# Patient Record
Sex: MALE | Race: WHITE | NOT HISPANIC OR LATINO | Employment: FULL TIME | URBAN - METROPOLITAN AREA
[De-identification: names, ages, dates, MRNs, and addresses within clinical notes are randomized per-mention and may not be internally consistent; named-entity substitution may affect disease eponyms.]

---

## 2019-01-17 ENCOUNTER — TELEPHONE (OUTPATIENT)
Dept: INTERNAL MEDICINE | Facility: CLINIC | Age: 59
End: 2019-01-17

## 2019-01-17 ENCOUNTER — APPOINTMENT (OUTPATIENT)
Dept: PREADMISSION TESTING | Facility: HOSPITAL | Age: 59
End: 2019-01-17

## 2019-01-17 DIAGNOSIS — R97.20 ABNORMAL PSA: Primary | ICD-10-CM

## 2019-01-17 LAB
ANION GAP SERPL CALCULATED.3IONS-SCNC: 3 MMOL/L (ref 3–11)
BUN BLD-MCNC: 13 MG/DL (ref 9–23)
BUN/CREAT SERPL: 13 (ref 7–25)
CALCIUM SPEC-SCNC: 9.2 MG/DL (ref 8.7–10.4)
CHLORIDE SERPL-SCNC: 106 MMOL/L (ref 99–109)
CO2 SERPL-SCNC: 30 MMOL/L (ref 20–31)
CREAT BLD-MCNC: 1 MG/DL (ref 0.6–1.3)
DEPRECATED RDW RBC AUTO: 41.4 FL (ref 37–54)
ERYTHROCYTE [DISTWIDTH] IN BLOOD BY AUTOMATED COUNT: 13 % (ref 11.3–14.5)
GFR SERPL CREATININE-BSD FRML MDRD: 77 ML/MIN/1.73
GLUCOSE BLD-MCNC: 106 MG/DL (ref 70–100)
HCT VFR BLD AUTO: 46.5 % (ref 38.9–50.9)
HGB BLD-MCNC: 15.7 G/DL (ref 13.1–17.5)
MCH RBC QN AUTO: 29.8 PG (ref 27–31)
MCHC RBC AUTO-ENTMCNC: 33.8 G/DL (ref 32–36)
MCV RBC AUTO: 88.2 FL (ref 80–99)
PLATELET # BLD AUTO: 199 10*3/MM3 (ref 150–450)
PMV BLD AUTO: 10.2 FL (ref 6–12)
POTASSIUM BLD-SCNC: 4.5 MMOL/L (ref 3.5–5.5)
RBC # BLD AUTO: 5.27 10*6/MM3 (ref 4.2–5.76)
SODIUM BLD-SCNC: 139 MMOL/L (ref 132–146)
WBC NRBC COR # BLD: 8.39 10*3/MM3 (ref 3.5–10.8)

## 2019-01-17 PROCEDURE — 80048 BASIC METABOLIC PNL TOTAL CA: CPT | Performed by: SURGERY

## 2019-01-17 PROCEDURE — 85027 COMPLETE CBC AUTOMATED: CPT | Performed by: SURGERY

## 2019-01-17 PROCEDURE — 36415 COLL VENOUS BLD VENIPUNCTURE: CPT

## 2019-01-17 NOTE — PAT
The following information and instructions were given:    Nothing to eat or drink after midnight except sips of water with routine prescribed medication (except blood thinner, certain blood pressure medications, diabetes, or weight reducing medication) unless otherwise instructed by your physician.  Do not eat, drink, smoke or chew gum after midnight the night before surgery. This also includes no mints.    EXCEPTION: ERAS patients Patient instructed to drink 20 ounces (or until full) of Gatorade or 20 ounces of G2 (if diabetic) and complete 1 hour before arrival time on the day of surgery. (NO RED Gatorade or G2)    Patient verbalized understanding.      DO NOT shave for two days before your procedure.  Do not wear makeup.      DO NOT wear fingernail polish (gel/regular) and/or acrylic/artificial nails on the day of surgery.   If a patient had recent manicure and would rather not remove polish or artificial nails, then the minimum requirement is that the polish/artificial nails must be removed from the middle finger on each hand.      If patient was having surgery on an upper extremity, then the patient was instructed that fingernail polish/artificial fingernails must be removed for surgery.  NO EXCEPTIONS.      If patient was having surgery on a lower extremity, then the patient was instructed that toenail polish on both extremities must be removed for surgery.  NO EXCEPTIONS.    Remove all jewelry (advised to go to jeweler if unable to remove).  Jewelry especially rings can no longer be taped for surgery.    Leave anything you consider valuable at home.      Bring the following with you (if applicable)   -picture ID and insurance cards   -Co-pay/deductible required by insurance   -Medications in the original bottles (not a list) including all over-the-counter meds     Education booklet, brochure, and/or given to patient.    Patient must have a  for transportation home after procedure.  It must be an   adult  that will take responsibility for care for 24 hours after surgery.    Patient instructed to drink 20 ounces (or until full) of Gatorade or 20 ounces of G2 (if diabetic) and complete 3 hours before your surgery start time. (NO RED Gatorade or G2)    Patient verbalized understanding.

## 2019-01-17 NOTE — DISCHARGE INSTRUCTIONS
The following information and instructions were given:    Nothing to eat or drink after midnight except sips of water with routine prescribed medication (except blood thinner, certain blood pressure medications, diabetes, or weight reducing medication) unless otherwise instructed by your physician.  Do not eat, drink, smoke or chew gum after midnight the night before surgery. This also includes no mints.    EXCEPTION: ERAS patients Patient instructed to drink 20 ounces (or until full) of Gatorade or 20 ounces of G2 (if diabetic) and complete 1 hour before arrival time on the day of surgery. (NO RED Gatorade or G2)    Patient verbalized understanding.      DO NOT shave for two days before your procedure.  Do not wear makeup.      DO NOT wear fingernail polish (gel/regular) and/or acrylic/artificial nails on the day of surgery.   If a patient had recent manicure and would rather not remove polish or artificial nails, then the minimum requirement is that the polish/artificial nails must be removed from the middle finger on each hand.      If patient was having surgery on an upper extremity, then the patient was instructed that fingernail polish/artificial fingernails must be removed for surgery.  NO EXCEPTIONS.      If patient was having surgery on a lower extremity, then the patient was instructed that toenail polish on both extremities must be removed for surgery.  NO EXCEPTIONS.    Remove all jewelry (advised to go to jeweler if unable to remove).  Jewelry especially rings can no longer be taped for surgery.    Leave anything you consider valuable at home.      Bring the following with you (if applicable)   -picture ID and insurance cards   -Co-pay/deductible required by insurance   -Medications in the original bottles (not a list) including all over-the-counter meds     Education booklet, brochure, and/or given to patient.    Patient must have a  for transportation home after procedure.  It must be an   adult  that will take responsibility for care for 24 hours after surgery.    Patient instructed to drink 20 ounces (or until full) of Gatorade or 20 ounces of G2 (if diabetic) and complete 3 hours before your surgery start time. (NO RED Gatorade or G2)    Patient verbalized understanding.  Hernia information

## 2019-01-21 ENCOUNTER — LAB REQUISITION (OUTPATIENT)
Dept: LAB | Facility: HOSPITAL | Age: 59
End: 2019-01-21

## 2019-01-21 DIAGNOSIS — K40.90 UNILATERAL INGUINAL HERNIA WITHOUT OBSTRUCTION OR GANGRENE: ICD-10-CM

## 2019-01-21 PROCEDURE — 88304 TISSUE EXAM BY PATHOLOGIST: CPT | Performed by: SURGERY

## 2019-01-22 LAB
CYTO UR: NORMAL
LAB AP CASE REPORT: NORMAL
LAB AP CLINICAL INFORMATION: NORMAL
PATH REPORT.FINAL DX SPEC: NORMAL
PATH REPORT.GROSS SPEC: NORMAL

## 2022-07-21 ENCOUNTER — TELEPHONE (OUTPATIENT)
Dept: INTERNAL MEDICINE | Facility: CLINIC | Age: 62
End: 2022-07-21

## 2022-07-21 NOTE — TELEPHONE ENCOUNTER
Attempted to call patient, VM box is full so could not leave a message. Absolute first 30 minute block available as of now is 08/02/2022  at 12:45

## 2022-07-21 NOTE — TELEPHONE ENCOUNTER
Caller: Noman Brewster    Relationship: Self    Best call back number: [phone]    What orders are you requesting (i.e. lab or imaging): LABS      Additional notes:    PATIENT WANTED TO SEE DOCTOR NORRIS BUT HE HAD NOT BEEN SEEN IN THREE YEARS AND HAD TO BE SCHEDULED AS NEW PATIENT.  [facility] DID NOT HAVE ANY NEW PATIENT APPOINTMENTS IN THE NEXT FEW MONTHS.     PATIENT WOULD LIKE TO COME IN BEFORE THEIR APPOINTMENT WITH DOCTOR ANDREWS ON 8/15/22 AT 9:15 AND HAVE LABS.  I ASSUME AN ORDER WOULD NEED TO BE PUT IN PLACE FIRST.     PLEASE CALL PATIENT BACK IF HE CAN GET AN APPOINTMENT WITH DOCTOR NORRIS INSTEAD AND LET HIM KNOW WHEN THE ORDERS FOR LABS ARE PUT IN SO THAT HE CAN COME IN AND HAVE BLOOD DRAWN SO THAT HE CAN DISCUSS RESULTS WHEN HE COMES TO THE APPOINTMENT

## 2022-07-22 DIAGNOSIS — Z12.5 PROSTATE CANCER SCREENING: Primary | ICD-10-CM

## 2022-07-22 DIAGNOSIS — Z13.228 ENCOUNTER FOR SCREENING FOR OTHER METABOLIC DISORDERS: ICD-10-CM

## 2022-07-22 DIAGNOSIS — Z13.220 LIPID SCREENING: ICD-10-CM

## 2022-07-22 DIAGNOSIS — Z13.0 SCREENING FOR IRON DEFICIENCY ANEMIA: ICD-10-CM

## 2022-07-22 DIAGNOSIS — Z11.59 ENCOUNTER FOR HEPATITIS C SCREENING TEST FOR LOW RISK PATIENT: ICD-10-CM

## 2022-07-26 NOTE — TELEPHONE ENCOUNTER
Patient scheduled with Dr Farrell on 08/05/2022 at 12:45 PM. He requests his lab work be put in early so that he can get it done the week before

## 2022-08-02 ENCOUNTER — LAB (OUTPATIENT)
Dept: LAB | Facility: HOSPITAL | Age: 62
End: 2022-08-02

## 2022-08-02 DIAGNOSIS — Z13.228 ENCOUNTER FOR SCREENING FOR OTHER METABOLIC DISORDERS: ICD-10-CM

## 2022-08-02 DIAGNOSIS — Z13.0 SCREENING FOR IRON DEFICIENCY ANEMIA: ICD-10-CM

## 2022-08-02 DIAGNOSIS — Z13.220 LIPID SCREENING: ICD-10-CM

## 2022-08-02 DIAGNOSIS — Z12.5 PROSTATE CANCER SCREENING: ICD-10-CM

## 2022-08-02 DIAGNOSIS — Z11.59 ENCOUNTER FOR HEPATITIS C SCREENING TEST FOR LOW RISK PATIENT: ICD-10-CM

## 2022-08-02 LAB
BASOPHILS # BLD AUTO: 0.02 10*3/MM3 (ref 0–0.2)
BASOPHILS NFR BLD AUTO: 0.3 % (ref 0–1.5)
CHOLEST SERPL-MCNC: 177 MG/DL (ref 0–200)
DEPRECATED RDW RBC AUTO: 37.3 FL (ref 37–54)
EOSINOPHIL # BLD AUTO: 0.27 10*3/MM3 (ref 0–0.4)
EOSINOPHIL NFR BLD AUTO: 3.4 % (ref 0.3–6.2)
ERYTHROCYTE [DISTWIDTH] IN BLOOD BY AUTOMATED COUNT: 11.8 % (ref 12.3–15.4)
HCT VFR BLD AUTO: 45.3 % (ref 37.5–51)
HCV AB SER DONR QL: NORMAL
HDLC SERPL-MCNC: 40 MG/DL (ref 40–60)
HGB BLD-MCNC: 15.4 G/DL (ref 13–17.7)
IMM GRANULOCYTES # BLD AUTO: 0.02 10*3/MM3 (ref 0–0.05)
IMM GRANULOCYTES NFR BLD AUTO: 0.3 % (ref 0–0.5)
LDLC SERPL CALC-MCNC: 123 MG/DL (ref 0–100)
LDLC/HDLC SERPL: 3.06 {RATIO}
LYMPHOCYTES # BLD AUTO: 2.35 10*3/MM3 (ref 0.7–3.1)
LYMPHOCYTES NFR BLD AUTO: 29.5 % (ref 19.6–45.3)
MCH RBC QN AUTO: 29.6 PG (ref 26.6–33)
MCHC RBC AUTO-ENTMCNC: 34 G/DL (ref 31.5–35.7)
MCV RBC AUTO: 87.1 FL (ref 79–97)
MONOCYTES # BLD AUTO: 0.48 10*3/MM3 (ref 0.1–0.9)
MONOCYTES NFR BLD AUTO: 6 % (ref 5–12)
NEUTROPHILS NFR BLD AUTO: 4.82 10*3/MM3 (ref 1.7–7)
NEUTROPHILS NFR BLD AUTO: 60.5 % (ref 42.7–76)
NRBC BLD AUTO-RTO: 0 /100 WBC (ref 0–0.2)
PLATELET # BLD AUTO: 212 10*3/MM3 (ref 140–450)
PMV BLD AUTO: 10.6 FL (ref 6–12)
PSA SERPL-MCNC: 5.6 NG/ML (ref 0–4)
RBC # BLD AUTO: 5.2 10*6/MM3 (ref 4.14–5.8)
TRIGL SERPL-MCNC: 73 MG/DL (ref 0–150)
VLDLC SERPL-MCNC: 14 MG/DL (ref 5–40)
WBC NRBC COR # BLD: 7.96 10*3/MM3 (ref 3.4–10.8)

## 2022-08-02 PROCEDURE — G0103 PSA SCREENING: HCPCS

## 2022-08-02 PROCEDURE — 85025 COMPLETE CBC W/AUTO DIFF WBC: CPT

## 2022-08-02 PROCEDURE — G0483 DRUG TEST DEF 22+ CLASSES: HCPCS

## 2022-08-02 PROCEDURE — 80061 LIPID PANEL: CPT

## 2022-08-02 PROCEDURE — 80307 DRUG TEST PRSMV CHEM ANLYZR: CPT

## 2022-08-02 PROCEDURE — 86803 HEPATITIS C AB TEST: CPT

## 2022-08-05 ENCOUNTER — OFFICE VISIT (OUTPATIENT)
Dept: INTERNAL MEDICINE | Facility: CLINIC | Age: 62
End: 2022-08-05

## 2022-08-05 VITALS
HEIGHT: 73 IN | HEART RATE: 84 BPM | SYSTOLIC BLOOD PRESSURE: 114 MMHG | WEIGHT: 224.8 LBS | TEMPERATURE: 98.2 F | DIASTOLIC BLOOD PRESSURE: 72 MMHG | BODY MASS INDEX: 29.79 KG/M2

## 2022-08-05 DIAGNOSIS — E78.5 DYSLIPIDEMIA: ICD-10-CM

## 2022-08-05 DIAGNOSIS — J30.1 SEASONAL ALLERGIC RHINITIS DUE TO POLLEN: ICD-10-CM

## 2022-08-05 DIAGNOSIS — Z00.00 PREVENTATIVE HEALTH CARE: Primary | ICD-10-CM

## 2022-08-05 DIAGNOSIS — R97.20 ABNORMAL PSA: ICD-10-CM

## 2022-08-05 LAB — DRUGS UR: NORMAL

## 2022-08-05 PROCEDURE — 99386 PREV VISIT NEW AGE 40-64: CPT | Performed by: INTERNAL MEDICINE

## 2022-08-05 RX ORDER — CETIRIZINE HYDROCHLORIDE 10 MG/1
10 TABLET ORAL DAILY
COMMUNITY

## 2022-08-05 RX ORDER — MULTIPLE VITAMINS W/ MINERALS TAB 9MG-400MCG
1 TAB ORAL DAILY
COMMUNITY

## 2022-08-05 NOTE — PROGRESS NOTES
[facility]     Noman Brewster  1960   [id number]      Patient Care Team:  Adrian Farrell MD as PCP - General (Internal Medicine)    Chief Complaint::   Chief Complaint   Patient presents with   • Establish Care        HPI    The patient presents for an annual examination..    Left-bicep pain  The patient states he is doing well overall. He reports having soreness in his left bicep for 6 to 8 weeks. He has been weight lifting and thinks he strained it one day while doing curls. Patient adds that it does not feel like tendonitis. The left bicep does not hurt until he gets about at a 90-degree angle. Holding his phone exacerbates it.     Knee pain  Patient reports that his strength and stamina are good. He reports losing approximately 15 pounds in the last year. One year ago, he states that he was running 5 miles, 3 times a week, in 11 minutes. Patient states both knees are always a little sore. He ran 680 miles last year. The more he runs, the better he feels. However, it is hard to stay consistent. He would like to get back to where he was a year ago.     Abnormal PSA  The patient had an elevated PSA level. He recalls having an elevated PSA level about 4 years ago. Patient adds that he has occasional slower urinary output.     Allergic Rhinitis   He reports having allergies all his life. The patient takes Zyrtec, which works well. He adds that he had a lot of trouble this summer. He does not go outside a lot. Patient works in the yard and wears a mask all the time. He tries to stay away from stuff that will bother his allergies.    Preventative   The patient states he has never had a colonoscopy.  He states he has one scheduled for 10/07/2022. His cholesterol is a little high, bad cholesterol is 123 mg/dL, total cholesterol was good, but the bad was a little high, the HDL is low, but not bad.      Chronic Conditions:      Patient Active Problem List   Diagnosis   • Seasonal allergic  "rhinitis due to pollen   • Dyslipidemia        No past medical history on file.    Past Surgical History:   Procedure Laterality Date   • CHOLECYSTECTOMY  2000   • INGUINAL HERNIA REPAIR Left 2019   • UMBILICAL HERNIA REPAIR  2010       Family History   Problem Relation Age of Onset   • Diabetes type II Mother    • Heart attack Father        Social History     Socioeconomic History   • Marital status:    Tobacco Use   • Smoking status: Never Smoker   • Smokeless tobacco: Never Used       Allergies   Allergen Reactions   • Azithromycin Itching         Current Outpatient Medications:   •  cetirizine (zyrTEC) 10 MG tablet, Take 10 mg by mouth Daily., Disp: , Rfl:   •  multivitamin with minerals tablet tablet, Take 1 tablet by mouth Daily., Disp: , Rfl:     Review of Systems   Constitutional: Negative for chills, fatigue and fever.   HENT: Negative for congestion, ear pain and sinus pressure.    Respiratory: Negative for cough, chest tightness, shortness of breath and wheezing.    Cardiovascular: Negative for chest pain and palpitations.   Gastrointestinal: Negative for abdominal pain, blood in stool and constipation.   Skin: Negative for color change.   Allergic/Immunologic: Negative for environmental allergies.   Neurological: Negative for dizziness, speech difficulty and headache.   Psychiatric/Behavioral: Negative for decreased concentration. The patient is not nervous/anxious.         Vital Signs  Vitals:    08/05/22 1251   BP: 114/72   BP Location: Left arm   Patient Position: Sitting   Cuff Size: Large Adult   Pulse: 84   Temp: 98.2 °F (36.8 °C)   Weight: 102 kg (224 lb 12.8 oz)   Height: 186.5 cm (73.43\")   PainSc:   2   PainLoc: Arm  Comment: left       Physical Exam  Vitals and nursing note reviewed.   Constitutional:       Appearance: He is well-developed and overweight.      Comments: There are scattered benign moles.  He is overweight.   HENT:      Head: Normocephalic.   Eyes:      " Conjunctiva/sclera: Conjunctivae normal.      Pupils: Pupils are equal, round, and reactive to light.   Neck:      Thyroid: No thyromegaly.   Cardiovascular:      Rate and Rhythm: Normal rate and regular rhythm.      Heart sounds: Normal heart sounds.   Pulmonary:      Effort: Pulmonary effort is normal.      Breath sounds: Normal breath sounds. No wheezing.   Abdominal:      General: Bowel sounds are normal.      Palpations: Abdomen is soft.      Tenderness: There is no abdominal tenderness.   Genitourinary:     Comments: Prostate gland is enlarged but firm, smooth, symmetric without nodules.  Musculoskeletal:         General: No tenderness. Normal range of motion.      Cervical back: Normal range of motion and neck supple.   Lymphadenopathy:      Cervical: No cervical adenopathy.   Skin:     General: Skin is warm and dry.      Findings: No rash.   Neurological:      Mental Status: He is alert and oriented to person, place, and time.      Cranial Nerves: No cranial nerve deficit.      Sensory: No sensory deficit.      Coordination: Coordination normal.      Gait: Gait normal.   Psychiatric:         Speech: Speech normal.         Behavior: Behavior normal.         Thought Content: Thought content normal.         Judgment: Judgment normal.          Procedures    ACE III MINI             Assessment/Plan:    1. Prevention  - Overall, he is in good health without chronic medical problems and with much better lifestyle habits, he just needs to eat better to lose weight. The left biceps tendon is a strain or a small tear and should heal with time. If it does not, PT would be the next treatment. He has a colonoscopy scheduled this fall.    2. Allergic rhinitis  - Continue cetirizine daily. We discussed the use of the nasal steroid and if he desires seeing an allergist for testing and possible immunotherapy.    3. Dyslipidemia  - LDL is slightly elevated at 120 mg/dL. The treatment remains healthy diet and weight  loss.    4. Abnormal PSA  - PSA is slightly elevated it. He will repeat in 1 month. Prostate exam is benign, but he does have evidence of benign prostatic hyperplasia.    Follow up in 1 year.    BMI is >= 25 and <30. (Overweight) The following options were offered after discussion;: exercise counseling/recommendations and nutrition counseling/recommendations     Age-appropriate counseling was provided for exercise which should be 150 minutes/week and also for nutrition and disease prevention.      Plan of care reviewed with patient at the conclusion of today's visit. Education was provided regarding diagnosis, management, and any prescribed or recommended OTC medications.Patient verbalizes understanding of and agreement with management plan.         Apoorva King MA       Transcribed from ambient dictation for Adrian Farrell MD by SHANNON MARCUS.  08/05/22   16:23 EDT    Patient verbalized consent to the visit recording.

## 2022-08-29 ENCOUNTER — TELEPHONE (OUTPATIENT)
Dept: INTERNAL MEDICINE | Facility: CLINIC | Age: 62
End: 2022-08-29

## 2022-08-29 NOTE — TELEPHONE ENCOUNTER
Hub staff attempted to follow warm transfer process and was unsuccessful     Caller: Noman Brewster    Relationship to patient: Self    Best call back number: [phone]    Patient is needing: PATIENT STATED HE RECEIVED A LETTER TO COMPLETE HIS LABS.  PATIENT STATED HE DID LABS AUGUST 2ND PRIOR TO HIS AUGUST 5TH APPOINTMENT.      PATIENT STATED HE HAS A SPOT ON HIS LEG AND WOULD LIKE TO KNOW IF PROVIDER CAN RECOMMEND A DERMATOLOGIST.

## 2022-09-02 ENCOUNTER — OFFICE VISIT (OUTPATIENT)
Dept: INTERNAL MEDICINE | Facility: CLINIC | Age: 62
End: 2022-09-02

## 2022-09-02 VITALS
BODY MASS INDEX: 29.69 KG/M2 | DIASTOLIC BLOOD PRESSURE: 64 MMHG | TEMPERATURE: 98.2 F | HEART RATE: 68 BPM | SYSTOLIC BLOOD PRESSURE: 112 MMHG | HEIGHT: 73 IN | WEIGHT: 224 LBS

## 2022-09-02 DIAGNOSIS — L98.9 SKIN LESION OF LEFT LOWER EXTREMITY: Primary | ICD-10-CM

## 2022-09-02 PROCEDURE — 99213 OFFICE O/P EST LOW 20 MIN: CPT | Performed by: NURSE PRACTITIONER

## 2022-09-02 NOTE — PROGRESS NOTES
"  Follow Up Office Visit      Patient Name: Noman Brewster  : 1960   MRN: [id number]   Care Team: Patient Care Team:  Adrian Farrell MD as PCP - General (Internal Medicine)    Chief Complaint:    Chief Complaint   Patient presents with   • Skin Lesion     Ont he back of the left calf        History of Present Illness: Noman Brewster is a 62 y.o. male with pertinent medical history significant for dyslipidemia, seasonal allergies and history of basal cell carcinoma.    Presents today for new acute issue of skin lesion on the left lower leg.     Reports he was just here recently for his annual exam with Dr. Farrell.  He was not aware of the skin condition at that time.  In fact, noticed this lesion in the last week by accident.  He denies any associated itching, irritation.  He is not aware how long the lesion has been present.    He has not used any medication therapies to treat the symptoms.    Subjective        I have reviewed and the following portions of the patient's history were updated as appropriate: past family history, past medical history, past social history, past surgical history and problem list.    Medications:     Current Outpatient Medications:   •  cetirizine (zyrTEC) 10 MG tablet, Take 10 mg by mouth Daily., Disp: , Rfl:   •  multivitamin with minerals tablet tablet, Take 1 tablet by mouth Daily., Disp: , Rfl:     Allergies:   Allergies   Allergen Reactions   • Azithromycin Itching       Objective     Physical Exam:  Vital Signs:   Vitals:    22 1002   BP: 112/64   BP Location: Left arm   Patient Position: Sitting   Cuff Size: Adult   Pulse: 68   Temp: 98.2 °F (36.8 °C)   TempSrc: Temporal   Weight: 102 kg (224 lb)   Height: 186.5 cm (73.43\")   PainSc: 0-No pain     Body mass index is 29.21 kg/m².     Physical Exam  Vitals and nursing note reviewed.   HENT:      Head: Normocephalic and atraumatic.      Comments: Patient wearing facial mask.  Eyes:      General: No scleral " icterus.  Cardiovascular:      Comments: Bilateral lower extremities with significant varicosities noted.  Pulmonary:      Effort: Pulmonary effort is normal. No respiratory distress.   Musculoskeletal:      Right lower leg: No edema.      Left lower leg: No edema.   Skin:     Comments: Left posterior calf area with  skin lesion. Erythematous, irregular borders, macular, raised areas on the left lateral border, asymmetrical in size but approximately the size of a nickel.  Nontender to touch.     Neurological:      Mental Status: He is alert.         Assessment / Plan      Assessment/Plan:   Problems Addressed This Visit    ICD-10-CM ICD-9-CM   1. Skin lesion of left lower extremity  L98.9 709.9      History of basal cell carcinoma  New skin lesion of posterior left lower extremity  -Referral to dermatology consultants for further evaluation/biopsy.    Plan of care reviewed with patient at the conclusion of today's visit. Education was provided regarding diagnosis and management.  Patient verbalizes understanding of and agreement with management plan.      Follow Up:   Return for Next scheduled follow up.        NATALIIA Lange  [facility] [address] [facility]    Please note that portions of this note were completed with a voice recognition program.      Answers for HPI/ROS submitted by the patient on 9/2/2022  What is the primary reason for your visit?: Other  Please describe your symptoms.: Suspicious spot on lower left leg  Have you had these symptoms before?: No  How long have you been having these symptoms?: Greater than 2 weeks

## 2022-09-16 ENCOUNTER — LAB (OUTPATIENT)
Dept: LAB | Facility: HOSPITAL | Age: 62
End: 2022-09-16

## 2022-09-16 DIAGNOSIS — R97.20 ABNORMAL PSA: ICD-10-CM

## 2022-09-16 LAB — PSA SERPL-MCNC: 4.92 NG/ML (ref 0–4)

## 2022-09-16 PROCEDURE — 84153 ASSAY OF PSA TOTAL: CPT

## 2022-10-08 DIAGNOSIS — R97.20 ABNORMAL PSA: Primary | ICD-10-CM

## 2023-03-24 ENCOUNTER — LAB (OUTPATIENT)
Dept: LAB | Facility: HOSPITAL | Age: 63
End: 2023-03-24
Payer: COMMERCIAL

## 2023-03-24 DIAGNOSIS — R97.20 ABNORMAL PSA: ICD-10-CM

## 2023-03-24 LAB — PSA SERPL-MCNC: 4.81 NG/ML (ref 0–4)

## 2023-03-24 PROCEDURE — 84153 ASSAY OF PSA TOTAL: CPT

## 2023-08-11 ENCOUNTER — OFFICE VISIT (OUTPATIENT)
Dept: INTERNAL MEDICINE | Facility: CLINIC | Age: 63
End: 2023-08-11
Payer: COMMERCIAL

## 2023-08-11 VITALS
DIASTOLIC BLOOD PRESSURE: 78 MMHG | WEIGHT: 221.4 LBS | HEIGHT: 73 IN | HEART RATE: 88 BPM | SYSTOLIC BLOOD PRESSURE: 122 MMHG | TEMPERATURE: 97.1 F | BODY MASS INDEX: 29.34 KG/M2

## 2023-08-11 DIAGNOSIS — E78.5 DYSLIPIDEMIA: ICD-10-CM

## 2023-08-11 DIAGNOSIS — Z00.00 PREVENTATIVE HEALTH CARE: Primary | ICD-10-CM

## 2023-08-11 DIAGNOSIS — R97.20 ABNORMAL PSA: ICD-10-CM

## 2023-08-11 RX ORDER — FLUTICASONE PROPIONATE 50 MCG
1 SPRAY, SUSPENSION (ML) NASAL DAILY
COMMUNITY
Start: 2023-04-10

## 2023-08-11 NOTE — PROGRESS NOTES
[facility]     Noman Brewster  1960   [id number]      Patient Care Team:  Adrian Farrell MD as PCP - General (Internal Medicine)    Chief Complaint::   Chief Complaint   Patient presents with    Annual Exam        HPI    The patient comes in for annual examination and follow-up of dyslipidemia.    Stress  The patient has been experiencing some stressors in his life recently. He has been having difficulty sleeping through all of this. He is averaging approximately 3 to 4 hours of sleep at night.     Health maintenance  He states that physically he is feeling well overall. He is exercising regularly. His strength and stamina are doing well.    Chronic Conditions:      Patient Active Problem List   Diagnosis    Seasonal allergic rhinitis due to pollen    Dyslipidemia        Past Medical History:   Diagnosis Date    Allergic Childhood    Hay fever--dust, pollen, etc.    Cholelithiasis 2000    Gall bladder removed in 2000    Colon polyp 2022    Diverticulosis 2022       Past Surgical History:   Procedure Laterality Date    CHOLECYSTECTOMY  2000    COLONOSCOPY  2022    INGUINAL HERNIA REPAIR Left 2019    UMBILICAL HERNIA REPAIR  2010       Family History   Problem Relation Age of Onset    Diabetes type II Mother     Heart attack Father        Social History     Socioeconomic History    Marital status:    Tobacco Use    Smoking status: Never    Smokeless tobacco: Never   Substance and Sexual Activity    Drug use: Never       Allergies   Allergen Reactions    Azithromycin Itching         Current Outpatient Medications:     fluticasone (FLONASE) 50 MCG/ACT nasal spray, 1 spray into the nostril(s) as directed by provider Daily., Disp: , Rfl:     multivitamin with minerals tablet tablet, Take 1 tablet by mouth Daily., Disp: , Rfl:     Immunization History   Administered Date(s) Administered    COVID-19 (MODERNA) Monovalent Original Booster 06/13/2022    COVID-19 (PFIZER) BIVALENT 12+YRS  "10/21/2022    COVID-19 (PFIZER) Purple Cap Monovalent 03/04/2021, 03/27/2021, 11/05/2021    Influenza Injectable Mdck Pf Quad 10/21/2022    Shingrix 08/11/2023    Tdap 11/05/2021        Health Maintenance Due   Topic Date Due    ANNUAL PHYSICAL  08/05/2023        Review of Systems   Constitutional:  Negative for chills, fatigue and fever.   HENT:  Negative for congestion, ear pain and sinus pressure.    Respiratory:  Negative for cough, chest tightness, shortness of breath and wheezing.    Cardiovascular:  Negative for chest pain and palpitations.   Gastrointestinal:  Negative for abdominal pain, blood in stool and constipation.   Skin:  Negative for color change.   Allergic/Immunologic: Negative for environmental allergies.   Neurological:  Negative for dizziness, speech difficulty and headache.   Psychiatric/Behavioral:  Positive for sleep disturbance and stress. Negative for decreased concentration. The patient is not nervous/anxious.       Vital Signs  Vitals:    08/11/23 1327   BP: 122/78   BP Location: Left arm   Patient Position: Sitting   Cuff Size: Adult   Pulse: 88   Temp: 97.1 øF (36.2 øC)   Weight: 100 kg (221 lb 6.4 oz)   Height: 186 cm (73.23\")   PainSc: 0-No pain       Physical Exam  Vitals and nursing note reviewed.   Constitutional:       General: He is not in acute distress.     Appearance: He is well-developed.   HENT:      Head: Normocephalic and atraumatic.      Right Ear: External ear normal.      Left Ear: External ear normal.   Eyes:      Conjunctiva/sclera: Conjunctivae normal.      Pupils: Pupils are equal, round, and reactive to light.   Neck:      Thyroid: No thyromegaly.      Vascular: No JVD.      Trachea: No tracheal deviation.   Cardiovascular:      Rate and Rhythm: Normal rate and regular rhythm.      Heart sounds: Normal heart sounds. No murmur heard.  Pulmonary:      Effort: Pulmonary effort is normal.      Breath sounds: Normal breath sounds.   Abdominal:      General: Bowel " sounds are normal. There is no distension.      Palpations: Abdomen is soft. There is no mass.      Tenderness: There is no abdominal tenderness. There is no guarding or rebound.   Musculoskeletal:      Cervical back: Normal range of motion and neck supple.   Lymphadenopathy:      Cervical: No cervical adenopathy.   Skin:     General: Skin is warm and dry.      Capillary Refill: Capillary refill takes less than 2 seconds.      Comments: Small varicosities in both distal lower extremities. He has onycholysis of most toenails.   Neurological:      Mental Status: He is alert and oriented to person, place, and time.      Cranial Nerves: No cranial nerve deficit.   Psychiatric:         Behavior: Behavior normal.        Procedures     Fall Risk Screen:  STEADI Fall Risk Assessment has not been completed.    Health Habits and Functional and Cognitive Screening:       No data to display                Depression [place]  PHQ-9 Total Score: 1     ACE III MINI             Assessment/Plan:    Diagnoses and all orders for this visit:    1. Preventative health care (Primary)    2. Dyslipidemia  -     Apolipoprotein B; Future  -     Comprehensive Metabolic Panel; Future  -     Lipid Panel; Future    3. Abnormal PSA  -     PSA DIAGNOSTIC; Future    Other orders  -     Shingrix Vaccine            Labs  Results for orders placed or performed in visit on 03/24/23   PSA DIAGNOSTIC    Specimen: Blood   Result Value Ref Range    PSA 4.810 (H) 0.000 - 4.000 ng/mL      Plan:    1. Prevention  - Physically, he is doing very well. Unfortunately, emotionally, he has been under considerable stress. At this point, he seems to be handling it fairly well. If he has recurrent problems with insomnia, trazodone would probably be helpful. He will let me know if that is the case. If he finds lack of appetite is a problem again, would consider dicyclomine. Shingrix vaccine administered today. Colonoscopy was done last year. He is fully vaccinated  against COVID-19.    2. Dyslipidemia  - Lipid panel is pending. The treatment remains healthy diet and avoidance of weight gain.    Follow up in 1 year.    BMI is >= 25 and <30. (Overweight) The following options were offered after discussion;: exercise counseling/recommendations and nutrition counseling/recommendations        Counseling was given to patient for the following topics: appropriate exercise as he is doing, disease prevention, and healthy eating habits.    Plan of care reviewed with patient at the conclusion of today's visit. Education was provided regarding diagnosis, management, and any prescribed or recommended OTC medications.Patient verbalizes understanding of and agreement with management plan.         Adrian Farrell MD       Transcribed from ambient dictation for Adrian Farrell MD by Lavonne Trotter.  08/11/23   15:17 EDT    Patient or patient representative verbalized consent to the visit recording.  I have personally performed the services described in this document as transcribed by the above individual, and it is both accurate and complete.

## 2023-08-16 ENCOUNTER — LAB (OUTPATIENT)
Dept: LAB | Facility: HOSPITAL | Age: 63
End: 2023-08-16
Payer: COMMERCIAL

## 2023-08-16 DIAGNOSIS — E78.5 DYSLIPIDEMIA: ICD-10-CM

## 2023-08-16 DIAGNOSIS — R97.20 ABNORMAL PSA: ICD-10-CM

## 2023-08-16 LAB
ALBUMIN SERPL-MCNC: 4.2 G/DL (ref 3.5–5.2)
ALBUMIN/GLOB SERPL: 1.6 G/DL
ALP SERPL-CCNC: 67 U/L (ref 39–117)
ALT SERPL W P-5'-P-CCNC: 17 U/L (ref 1–41)
ANION GAP SERPL CALCULATED.3IONS-SCNC: 10.4 MMOL/L (ref 5–15)
AST SERPL-CCNC: 17 U/L (ref 1–40)
BILIRUB SERPL-MCNC: 0.9 MG/DL (ref 0–1.2)
BUN SERPL-MCNC: 8 MG/DL (ref 8–23)
BUN/CREAT SERPL: 8.9 (ref 7–25)
CALCIUM SPEC-SCNC: 9.2 MG/DL (ref 8.6–10.5)
CHLORIDE SERPL-SCNC: 106 MMOL/L (ref 98–107)
CHOLEST SERPL-MCNC: 164 MG/DL (ref 0–200)
CO2 SERPL-SCNC: 22.6 MMOL/L (ref 22–29)
CREAT SERPL-MCNC: 0.9 MG/DL (ref 0.76–1.27)
EGFRCR SERPLBLD CKD-EPI 2021: 96 ML/MIN/1.73
GLOBULIN UR ELPH-MCNC: 2.7 GM/DL
GLUCOSE SERPL-MCNC: 118 MG/DL (ref 65–99)
HDLC SERPL-MCNC: 37 MG/DL (ref 40–60)
LDLC SERPL CALC-MCNC: 113 MG/DL (ref 0–100)
LDLC/HDLC SERPL: 3.03 {RATIO}
POTASSIUM SERPL-SCNC: 3.9 MMOL/L (ref 3.5–5.2)
PROT SERPL-MCNC: 6.9 G/DL (ref 6–8.5)
PSA SERPL-MCNC: 5.03 NG/ML (ref 0–4)
SODIUM SERPL-SCNC: 139 MMOL/L (ref 136–145)
TRIGL SERPL-MCNC: 74 MG/DL (ref 0–150)
VLDLC SERPL-MCNC: 14 MG/DL (ref 5–40)

## 2023-08-16 PROCEDURE — 36415 COLL VENOUS BLD VENIPUNCTURE: CPT

## 2023-08-16 PROCEDURE — 80061 LIPID PANEL: CPT

## 2023-08-16 PROCEDURE — 84153 ASSAY OF PSA TOTAL: CPT

## 2023-08-16 PROCEDURE — 80053 COMPREHEN METABOLIC PANEL: CPT

## 2023-08-16 PROCEDURE — 82172 ASSAY OF APOLIPOPROTEIN: CPT

## 2023-08-18 LAB — APO B SERPL-MCNC: 88 MG/DL

## 2023-08-28 ENCOUNTER — TELEPHONE (OUTPATIENT)
Dept: INTERNAL MEDICINE | Facility: CLINIC | Age: 63
End: 2023-08-28
Payer: COMMERCIAL

## 2023-08-28 DIAGNOSIS — R97.20 ABNORMAL PSA: Primary | ICD-10-CM

## 2023-08-28 NOTE — TELEPHONE ENCOUNTER
Pt sent the following via the appointment texting hakeem at 3:07 PM today:    Dr. Farrell, if you think it is best for me to see a urologist, I will do that. Should I contact them directly, or will you make a referral? -Noman Brewster

## 2023-09-26 ENCOUNTER — OFFICE VISIT (OUTPATIENT)
Dept: UROLOGY | Facility: CLINIC | Age: 63
End: 2023-09-26
Payer: COMMERCIAL

## 2023-09-26 VITALS
HEART RATE: 84 BPM | OXYGEN SATURATION: 98 % | WEIGHT: 221 LBS | SYSTOLIC BLOOD PRESSURE: 124 MMHG | HEIGHT: 73 IN | DIASTOLIC BLOOD PRESSURE: 78 MMHG | BODY MASS INDEX: 29.29 KG/M2

## 2023-09-26 DIAGNOSIS — N40.1 BENIGN LOCALIZED PROSTATIC HYPERPLASIA WITH LOWER URINARY TRACT SYMPTOMS (LUTS): Primary | ICD-10-CM

## 2023-09-26 DIAGNOSIS — R97.20 ELEVATED PROSTATE SPECIFIC ANTIGEN (PSA): ICD-10-CM

## 2023-09-26 DIAGNOSIS — R97.20 ELEVATED PROSTATE SPECIFIC ANTIGEN (PSA): Primary | ICD-10-CM

## 2023-09-26 LAB
BILIRUB BLD-MCNC: NEGATIVE MG/DL
CLARITY, POC: CLEAR
COLOR UR: YELLOW
EXPIRATION DATE: NORMAL
GLUCOSE UR STRIP-MCNC: NEGATIVE MG/DL
KETONES UR QL: NEGATIVE
LEUKOCYTE EST, POC: NEGATIVE
Lab: NORMAL
NITRITE UR-MCNC: NEGATIVE MG/ML
PH UR: 6 [PH] (ref 5–8)
PROT UR STRIP-MCNC: NEGATIVE MG/DL
RBC # UR STRIP: NEGATIVE /UL
SP GR UR: 1.01 (ref 1–1.03)
UROBILINOGEN UR QL: NORMAL

## 2023-09-26 RX ORDER — HYDROCODONE BITARTRATE AND ACETAMINOPHEN 5; 325 MG/1; MG/1
1 TABLET ORAL EVERY 6 HOURS PRN
COMMUNITY
Start: 2023-09-25

## 2023-09-26 RX ORDER — TAMSULOSIN HYDROCHLORIDE 0.4 MG/1
1 CAPSULE ORAL DAILY
Qty: 90 CAPSULE | Refills: 1 | Status: SHIPPED | OUTPATIENT
Start: 2023-09-26 | End: 2024-03-24

## 2023-09-26 NOTE — PROGRESS NOTES
Elevated PSA Office Visit      Patient Name: Noman Brewster  : 1960   MRN: [id number]     Chief Complaint:  Elevated PSA.    Chief Complaint   Patient presents with    Elevated PSA       Referring Provider: Adrian Farrell, *    History of Present Illness: Noman Brewster is a 63 y.o. male who presents today with history of elevated PSA.  He reports he has had an elevated PSA for a long time.  He recently had a PSA of 5.030.   No dysuria or gross hematuria.  He reports he wakes up on average 2 times/night.  He ha some intermittency.  He states some days he feels like he is doing well.  He reports weak stream.  He feels like he does not completely empty.    No family history of prostate cancer     Lab Results   Component Value Date    PSA 5.030 (H) 2023    PSA 4.810 (H) 2023    PSA 4.920 (H) 2022        Prostate Biopsy Collaborative Group (PBCG) Risk Calculator:   21% risk high grade cancer  21% low grade cancer  58% negative biopsy       Subjective      Review of System:   Review of Systems   Constitutional: Negative.    HENT: Negative.     Eyes: Negative.    Respiratory: Negative.     Cardiovascular: Negative.    Gastrointestinal: Negative.    Endocrine: Negative.    Genitourinary:  Positive for decreased urine volume and urgency.   Musculoskeletal: Negative.    Skin: Negative.    Allergic/Immunologic: Negative.    Neurological: Negative.    Hematological: Negative.    Psychiatric/Behavioral: Negative.      I have reviewed the ROS documented by my clinical staff, I have updated appropriately and I agree. Kathi Wright MD    Past Medical History:   Past Medical History:   Diagnosis Date    Allergic Childhood    Hay fever--dust, pollen, etc.    Cholelithiasis     Gall bladder removed in     Colon polyp     Diverticulosis        Past Surgical History:   Past Surgical History:   Procedure Laterality Date    CHOLECYSTECTOMY      COLONOSCOPY      INGUINAL HERNIA  REPAIR Left 2019    UMBILICAL HERNIA REPAIR  2010       Family History:   Family History   Problem Relation Age of Onset    Diabetes type II Mother     Heart attack Father        Social History:   Social History     Socioeconomic History    Marital status:    Tobacco Use    Smoking status: Never     Passive exposure: Past    Smokeless tobacco: Never   Vaping Use    Vaping Use: Never used   Substance and Sexual Activity    Alcohol use: Not Currently    Drug use: Never    Sexual activity: Defer       Medications:     Current Outpatient Medications:     fluticasone (FLONASE) 50 MCG/ACT nasal spray, 1 spray into the nostril(s) as directed by provider Daily., Disp: , Rfl:     HYDROcodone-acetaminophen (NORCO) 5-325 MG per tablet, Take 1 tablet by mouth Every 6 (Six) Hours As Needed., Disp: , Rfl:     multivitamin with minerals tablet tablet, Take 1 tablet by mouth Daily., Disp: , Rfl:     tamsulosin (FLOMAX) 0.4 MG capsule 24 hr capsule, Take 1 capsule by mouth Daily for 180 days., Disp: 90 capsule, Rfl: 1    Allergies:   Allergies   Allergen Reactions    Azithromycin Itching       IPSS Questionnaire (AUA-7):  Over the past month…    1)  Incomplete Emptying  How often have you had a sensation of not emptying your bladder?  3 - About half the time   2)  Frequency  How often have you had to urinate less than every two hours? 2 - Less than half the time   3)  Intermittency  How often have you found you stopped and started again several times when you urinated?  2 - Less than half the time   4) Urgency  How often have you found it difficult to postpone urination?  2 - Less than half the time   5) Weak Stream  How often have you had a weak urinary stream?  3 - About half the time   6) Straining  How often have you had to push or strain to begin urination?  1 - Less than 1 time in 5   7) Nocturia  How many times did you typically get up at night to urinate?  2 - 2 times   Total Score:  15   The International Prostate  "Symptom Score (IPSS) is used to screen, diagnose, track symptoms of benign prostatic hyperplasia (BPH).    0-7 pts (Mild Symptoms)  / 8-19 pts (Moderate) / 20-35 (Severe)    Quality of life due to urinary symptoms:  If you were to spend the rest of your life with your urinary condition the way it is now, how would you feel about that? 4-Mostly Dissatisfied   Urine Leakage (Incontinence) 0-No Leakage         Post void residual bladder scan:   10 mL        Objective     Physical Exam:   Vital Signs:   Vitals:    09/26/23 0957   BP: 124/78   Pulse: 84   SpO2: 98%   Weight: 100 kg (221 lb)   Height: 186 cm (73.23\")     Body mass index is 28.98 kg/m².     Physical Exam  Vitals and nursing note reviewed.   Constitutional:       General: He is awake. He is not in acute distress.     Appearance: Normal appearance. He is well-developed.   HENT:      Head: Normocephalic and atraumatic.      Right Ear: External ear normal.      Left Ear: External ear normal.      Nose: Nose normal.   Eyes:      Conjunctiva/sclera: Conjunctivae normal.   Pulmonary:      Effort: Pulmonary effort is normal.   Abdominal:      General: There is no distension.      Palpations: Abdomen is soft. There is no mass.      Tenderness: There is no abdominal tenderness. There is no right CVA tenderness, left CVA tenderness, guarding or rebound.      Hernia: No hernia is present. There is no hernia in the left inguinal area or right inguinal area.   Genitourinary:     Pubic Area: No rash.       Penis: Normal.       Testes: Normal.      Prostate: Normal.      Rectum: Normal. No mass or tenderness. Normal anal tone.      Comments: Approx 45 g prostate.  No nodules   Musculoskeletal:      Cervical back: Normal range of motion.   Lymphadenopathy:      Lower Body: No right inguinal adenopathy. No left inguinal adenopathy.   Skin:     General: Skin is warm.   Neurological:      General: No focal deficit present.      Mental Status: He is alert and oriented to " person, place, and time.   Psychiatric:         Behavior: Behavior normal. Behavior is cooperative.     Labs:   Hematocrit (%)   Date Value   08/02/2022 45.3   01/17/2019 46.5       Lab Results   Component Value Date    PSA 5.030 (H) 08/16/2023    PSA 4.810 (H) 03/24/2023    PSA 4.920 (H) 09/16/2022       Images:   No Images in the past 120 days found..    Measures:   Tobacco:   Noman Brewster  reports that he has never smoked. He has been exposed to tobacco smoke. He has never used smokeless tobacco..      Urine Incontinence: Patient reports that he is not currently experiencing any symptoms of urinary incontinence.         Assessment / Plan      Assessment/Plan:   Mr. Brewster is a 63 y.o. male with elevated PSA.  He reports he has had an elevated PSA for some time.  He also has significant LUTS.  I will start him on tamsulosin.  We discussed his options and we will move forward with an MRI.  I will have him follow up in the next few weeks with his MRI results.      Diagnoses and all orders for this visit:    1. Benign localized prostatic hyperplasia with lower urinary tract symptoms (LUTS) (Primary)  -     tamsulosin (FLOMAX) 0.4 MG capsule 24 hr capsule; Take 1 capsule by mouth Daily for 180 days.  Dispense: 90 capsule; Refill: 1  -     POC Urinalysis Dipstick, Automated    2. Elevated prostate specific antigen (PSA)  -     Cancel: MRI Prostate With & Without Contrast; Future  -     MRI Prostate With & Without Contrast; Future  -     POC Urinalysis Dipstick, Automated        Patient Education:   I discussed with Mr. Brewster the possible causes of an elevated PSA.  I discussed that his options are to repeat his PSA in 6 months or move directly to biopsy.  I discussed the possible role of a multiparametric MRI.  We discussed that repeating a PSA in 6 months may delay his diagnosis or prostate cancer and potentially delay treatment.      Follow Up:   Return in about 4 weeks (around 10/24/2023) for Recheck.    I spent  approximately 45 minutes providing clinical care for this patient; including review of patient's chart and provider documentation, face to face time spent with patient in examination room (obtaining history, performing physical exam, discussing diagnosis and management options), placing orders, and completing patient documentation.     Kathi Wright MD  [facility] [place]

## 2023-09-28 ENCOUNTER — LAB (OUTPATIENT)
Dept: LAB | Facility: HOSPITAL | Age: 63
End: 2023-09-28
Payer: COMMERCIAL

## 2023-09-28 DIAGNOSIS — R97.20 ELEVATED PROSTATE SPECIFIC ANTIGEN (PSA): ICD-10-CM

## 2023-09-28 LAB
CREAT SERPL-MCNC: 0.9 MG/DL (ref 0.76–1.27)
EGFRCR SERPLBLD CKD-EPI 2021: 96 ML/MIN/1.73

## 2023-09-28 PROCEDURE — 82565 ASSAY OF CREATININE: CPT

## 2023-09-28 PROCEDURE — 36415 COLL VENOUS BLD VENIPUNCTURE: CPT

## 2023-09-29 ENCOUNTER — TELEPHONE (OUTPATIENT)
Dept: UROLOGY | Facility: CLINIC | Age: 63
End: 2023-09-29
Payer: COMMERCIAL

## 2023-09-29 NOTE — TELEPHONE ENCOUNTER
"Relay     \"I called patient to let him know of the MRI I have got set up for him at [facility] on October 11th at 7:45. The address is [address] [place] [address]. Prep for this is nothing to eat or drink past midnight, administer 1 fleet enema one hour prior to leaving his home, and no ejaculation 72 hours prior.\"      "

## 2023-10-17 ENCOUNTER — TELEPHONE (OUTPATIENT)
Dept: INTERNAL MEDICINE | Facility: CLINIC | Age: 63
End: 2023-10-17

## 2023-10-17 ENCOUNTER — CLINICAL SUPPORT (OUTPATIENT)
Dept: INTERNAL MEDICINE | Facility: CLINIC | Age: 63
End: 2023-10-17
Payer: COMMERCIAL

## 2023-10-17 NOTE — TELEPHONE ENCOUNTER
Discussed with pt - advised not to take all at once. Explained he will need to wait 2 weeks between vaccines. He voices understanding.  He may come in today for a shingles vaccine

## 2023-10-17 NOTE — TELEPHONE ENCOUNTER
Caller: Noman Brewster    Relationship to patient: Self    Best call back number: [phone]     PATIENT IS CALLING TO SEE IF THE OFFICE HAS THE COVID AND RSV VACCINATIONS.  ALSO, HE WOULD LIKE TO KNOW IF HE COULD GET THESE VACCINATIONS PLUS THE FLU AT THE SAME TIME.   Applied

## 2023-10-24 ENCOUNTER — OFFICE VISIT (OUTPATIENT)
Dept: UROLOGY | Facility: CLINIC | Age: 63
End: 2023-10-24
Payer: COMMERCIAL

## 2023-10-24 VITALS
HEART RATE: 89 BPM | OXYGEN SATURATION: 98 % | SYSTOLIC BLOOD PRESSURE: 118 MMHG | BODY MASS INDEX: 29.29 KG/M2 | HEIGHT: 73 IN | DIASTOLIC BLOOD PRESSURE: 72 MMHG | WEIGHT: 221 LBS

## 2023-10-24 DIAGNOSIS — N40.1 BENIGN LOCALIZED PROSTATIC HYPERPLASIA WITH LOWER URINARY TRACT SYMPTOMS (LUTS): ICD-10-CM

## 2023-10-24 DIAGNOSIS — R97.20 ELEVATED PROSTATE SPECIFIC ANTIGEN (PSA): Primary | ICD-10-CM

## 2023-10-24 LAB
BILIRUB BLD-MCNC: NEGATIVE MG/DL
CLARITY, POC: CLEAR
COLOR UR: YELLOW
EXPIRATION DATE: ABNORMAL
GLUCOSE UR STRIP-MCNC: NEGATIVE MG/DL
KETONES UR QL: ABNORMAL
LEUKOCYTE EST, POC: ABNORMAL
Lab: ABNORMAL
NITRITE UR-MCNC: NEGATIVE MG/ML
PH UR: 6.5 [PH] (ref 5–8)
PROT UR STRIP-MCNC: NEGATIVE MG/DL
RBC # UR STRIP: NEGATIVE /UL
SP GR UR: 1.01 (ref 1–1.03)
UROBILINOGEN UR QL: NORMAL

## 2023-10-24 RX ORDER — TAMSULOSIN HYDROCHLORIDE 0.4 MG/1
1 CAPSULE ORAL DAILY
Qty: 90 CAPSULE | Refills: 1 | Status: SHIPPED | OUTPATIENT
Start: 2023-10-24 | End: 2024-04-21

## 2023-10-24 NOTE — PROGRESS NOTES
Follow Up Office Visit      Patient Name: Noman Brewster  : 1960   MRN: [id number]     Chief Complaint:    Chief Complaint   Patient presents with    Benign localized prostatic hyperplasia with lower urinary t       Referring Provider: No ref. provider found    History of Present Illness: Noman Brewster is a 63 y.o. male who presents today for follow up of  elevated PSA.  He established care in 2023. He reported a long history of elevated PSA, trends below; most recent 5.030. He additionally reported LUTS, namely nocturia x2, intermittency, weak stream, and sensation of incomplete emptying. No family history of prostate cancer. He was started on tamsulosin.    He returns today for follow-up after mpMRI. He reports significant improvement in LUTS since starting tamsulosin. Occasional, mild light-headedness since starting tamsulosin. No additional  concerns.    Subjective      Review of System:   Review of Systems   Constitutional: Negative.    HENT: Negative.     Eyes: Negative.    Respiratory: Negative.     Cardiovascular: Negative.    Gastrointestinal: Negative.    Endocrine: Negative.    Genitourinary: Negative.    Musculoskeletal: Negative.    Skin: Negative.    Allergic/Immunologic: Negative.    Neurological:  Positive for light-headedness.   Hematological: Negative.    Psychiatric/Behavioral: Negative.        I have reviewed the ROS documented by my clinical staff, I have updated appropriately and I agree. Kathi Wright MD    I have reviewed and the following portions of the patient's history were updated as appropriate: past family history, past medical history, past social history, past surgical history and problem list.    Past Medical History:   Past Medical History:   Diagnosis Date    Allergic Childhood    Hay fever--dust, pollen, etc.    Cholelithiasis     Gall bladder removed in     Colon polyp     Diverticulosis        Past Surgical History:  Past Surgical History:  "  Procedure Laterality Date    CHOLECYSTECTOMY  2000    COLONOSCOPY  2022    INGUINAL HERNIA REPAIR Left 2019    UMBILICAL HERNIA REPAIR  2010       Family History:   family history includes Diabetes type II in his mother; Heart attack in his father.   Otherwise pertinent FHx was reviewed and not pertinent to current issue.    Social History:    reports that he has never smoked. He has been exposed to tobacco smoke. He has never used smokeless tobacco. He reports that he does not currently use alcohol. He reports that he does not use drugs.    Medications:     Current Outpatient Medications:     fluticasone (FLONASE) 50 MCG/ACT nasal spray, 1 spray into the nostril(s) as directed by provider Daily., Disp: , Rfl:     multivitamin with minerals tablet tablet, Take 1 tablet by mouth Daily., Disp: , Rfl:     tamsulosin (FLOMAX) 0.4 MG capsule 24 hr capsule, Take 1 capsule by mouth Daily for 180 days., Disp: 90 capsule, Rfl: 1    HYDROcodone-acetaminophen (NORCO) 5-325 MG per tablet, Take 1 tablet by mouth Every 6 (Six) Hours As Needed., Disp: , Rfl:     Allergies:   Allergies   Allergen Reactions    Azithromycin Itching       Post void residual bladder scan:   23 mL     Objective     Physical Exam:   Vital Signs:   Vitals:    10/24/23 1429   BP: 118/72   Pulse: 89   SpO2: 98%   Weight: 100 kg (221 lb)   Height: 186 cm (73.23\")   PainSc: 0-No pain     Body mass index is 28.98 kg/m².       Constitutional: Awake, alert    Eyes: PERRLA, sclerae anicteric, no conjunctival injection   HENT: Normocephalic, atraumatic, mucous membranes moist   Neck: trachea midline   Respiratory: Equal chest rise, non-labored respirations    Cardiovascular: well-perfused   Gastrointestinal: non-distended   Musculoskeletal: No bilateral ankle edema, no clubbing or cyanosis to extremities   Psychiatric: Appropriate affect, cooperative   Neurologic: Oriented x 3, Cranial Nerves grossly intact, speech clear   Skin: No rashes       Labs:   Brief " Urine Lab Results  (Last result in the past 365 days)        Color   Clarity   Blood   Leuk Est   Nitrite   Protein   CREAT   Urine HCG        10/24/23 1531 Yellow   Clear   Negative   Trace   Negative   Negative                        Lab Results   Component Value Date    GLUCOSE 118 (H) 08/16/2023    CALCIUM 9.2 08/16/2023     08/16/2023    K 3.9 08/16/2023    CO2 22.6 08/16/2023     08/16/2023    BUN 8 08/16/2023    CREATININE 0.90 09/28/2023    EGFRIFNONA 77 01/17/2019    BCR 8.9 08/16/2023    ANIONGAP 10.4 08/16/2023       Lab Results   Component Value Date    WBC 7.96 08/02/2022    HGB 15.4 08/02/2022    HCT 45.3 08/02/2022    MCV 87.1 08/02/2022     08/02/2022       Lab Results   Component Value Date    PSA 5.030 (H) 08/16/2023    PSA 4.810 (H) 03/24/2023    PSA 4.920 (H) 09/16/2022       Images:   MR prostate without & with IV contrast    Result Date: 10/11/2023  Enlarged prostate with BPH. Indeterminate focus in the right posterior transition zone at the mid gland. PI-RADS 3 - Intermediate (the presence of clinically significant cancer is equivocal). Images reviewed, interpreted, and dictated by Macario Cordova MD      Measures:   Tobacco:   Noman Brewster  reports that he has never smoked. He has been exposed to tobacco smoke. He has never used smokeless tobacco.      Urine Incontinence: Patient reports that he is not currently experiencing any symptoms of urinary incontinence.       Assessment / Plan      Assessment/Plan:   63 y.o. male who presented today for follow up of elevated PSA. mpMRI demonstrated 100 g gland with PI-RADS 3 focus at right posterior TZ at mid gland. PSA density 0.05. He elected to proceed with observation and repeat PSA in 6 months.  Given the size of his prostate and PSA density this is reasonable.       - RTC in 6 months with PSA   - continue tamsulosin at bedtime      Patient Education:  We discussed the possible etiologies of an elevated PSA.  I discussed that  his options are to repeat his PSA in 6 months or move to MR fusion biopsy. We discussed the indeterminate results of his mpMRI but the low PSA density (0.05) given the size of his gland. We discussed that repeating a PSA in 6 months may delay his diagnosis or prostate cancer and potentially delay treatment.     Diagnoses and all orders for this visit:    1. Elevated prostate specific antigen (PSA) (Primary)  -     PSA Diagnostic; Future  -     POC Urinalysis Dipstick, Automated    2. Benign localized prostatic hyperplasia with lower urinary tract symptoms (LUTS)  -     tamsulosin (FLOMAX) 0.4 MG capsule 24 hr capsule; Take 1 capsule by mouth Daily for 180 days.  Dispense: 90 capsule; Refill: 1  -     POC Urinalysis Dipstick, Automated         Follow Up:   No follow-ups on file.    I spent approximately 20 minutes providing clinical care for this patient; including review of patient's chart and provider documentation, face to face time spent with patient in examination room (obtaining history, performing physical exam, discussing diagnosis and management options), placing orders, and completing patient documentation.     Kathi Wright MD  [facility] [place]

## 2024-04-17 ENCOUNTER — LAB (OUTPATIENT)
Dept: LAB | Facility: HOSPITAL | Age: 64
End: 2024-04-17
Payer: COMMERCIAL

## 2024-04-17 DIAGNOSIS — R97.20 ELEVATED PROSTATE SPECIFIC ANTIGEN (PSA): ICD-10-CM

## 2024-04-17 LAB — PSA SERPL-MCNC: 4.97 NG/ML (ref 0–4)

## 2024-04-17 PROCEDURE — 84153 ASSAY OF PSA TOTAL: CPT

## 2024-04-17 PROCEDURE — 36415 COLL VENOUS BLD VENIPUNCTURE: CPT

## 2024-04-24 ENCOUNTER — OFFICE VISIT (OUTPATIENT)
Dept: UROLOGY | Facility: CLINIC | Age: 64
End: 2024-04-24
Payer: COMMERCIAL

## 2024-04-24 VITALS
DIASTOLIC BLOOD PRESSURE: 74 MMHG | BODY MASS INDEX: 29.29 KG/M2 | OXYGEN SATURATION: 94 % | WEIGHT: 221 LBS | HEART RATE: 63 BPM | HEIGHT: 73 IN | SYSTOLIC BLOOD PRESSURE: 126 MMHG

## 2024-04-24 DIAGNOSIS — N40.1 BENIGN LOCALIZED PROSTATIC HYPERPLASIA WITH LOWER URINARY TRACT SYMPTOMS (LUTS): ICD-10-CM

## 2024-04-24 DIAGNOSIS — R97.20 ELEVATED PROSTATE SPECIFIC ANTIGEN (PSA): Primary | ICD-10-CM

## 2024-04-24 LAB
BILIRUB BLD-MCNC: NEGATIVE MG/DL
CLARITY, POC: CLEAR
COLOR UR: YELLOW
EXPIRATION DATE: NORMAL
GLUCOSE UR STRIP-MCNC: NEGATIVE MG/DL
KETONES UR QL: NEGATIVE
LEUKOCYTE EST, POC: NEGATIVE
Lab: NORMAL
NITRITE UR-MCNC: NEGATIVE MG/ML
PH UR: 7 [PH] (ref 5–8)
PROT UR STRIP-MCNC: NEGATIVE MG/DL
RBC # UR STRIP: NEGATIVE /UL
SP GR UR: 1.01 (ref 1–1.03)
UROBILINOGEN UR QL: NORMAL

## 2024-04-24 RX ORDER — TAMSULOSIN HYDROCHLORIDE 0.4 MG/1
1 CAPSULE ORAL DAILY
Qty: 90 CAPSULE | Refills: 3 | Status: SHIPPED | OUTPATIENT
Start: 2024-04-24 | End: 2025-04-19

## 2024-04-24 RX ORDER — TAMSULOSIN HYDROCHLORIDE 0.4 MG/1
1 CAPSULE ORAL DAILY
COMMUNITY
End: 2024-04-24 | Stop reason: SDUPTHER

## 2024-04-24 RX ORDER — TAMSULOSIN HYDROCHLORIDE 0.4 MG/1
1 CAPSULE ORAL DAILY
Qty: 30 CAPSULE | Refills: 6 | Status: SHIPPED | OUTPATIENT
Start: 2024-04-24

## 2024-04-24 NOTE — PROGRESS NOTES
Follow Up Office Visit      Patient Name: Noman Brewster  : 1960   MRN: [id number]     Chief Complaint:    Chief Complaint   Patient presents with    Elevated PSA       Referring Provider: No ref. provider found    History of Present Illness: Noman Brewster is a 63 y.o. male who presents today for follow up of  elevated PSA and BPH/LUTS.     Briefly, he has had an elevated PSA for a long time. 5.030 in 2023. IPSS in 2023 15. Started on flomax. mpMRI in 10/2023 demonstrated 100g gland (PSA density 0.05) with PIRADS 3 lesion right posterior PZ. He elected to proceed with surveillance.    He returns today for follow-up with PSA prior, down to 4.9. IPSS 7. He reports noticeable improvement in urgency and frequency. Reports he is overall satisfied with current symptoms and not interested in further intervention at this time.    Subjective      Review of System:   Review of Systems   Constitutional: Negative.    HENT: Negative.     Eyes: Negative.    Respiratory: Negative.     Cardiovascular: Negative.    Gastrointestinal: Negative.    Endocrine: Negative.    Genitourinary: Negative.    Musculoskeletal: Negative.    Skin: Negative.    Allergic/Immunologic: Negative.    Neurological: Negative.    Hematological: Negative.    Psychiatric/Behavioral: Negative.        I have reviewed the ROS documented by my clinical staff, I have updated appropriately and I agree. Kathi Wright MD    I have reviewed and the following portions of the patient's history were updated as appropriate: past family history, past medical history, past social history, past surgical history and problem list.    Past Medical History:   Past Medical History:   Diagnosis Date    Allergic Childhood    Hay fever--dust, pollen, etc.    Cholelithiasis     Gall bladder removed in     Colon polyp     Diverticulosis        Past Surgical History:  Past Surgical History:   Procedure Laterality Date    CHOLECYSTECTOMY       COLONOSCOPY  2022    INGUINAL HERNIA REPAIR Left 2019    UMBILICAL HERNIA REPAIR  2010       Family History:   family history includes Diabetes type II in his mother; Heart attack in his father.   Otherwise pertinent FHx was reviewed and not pertinent to current issue.    Social History:    reports that he has never smoked. He has been exposed to tobacco smoke. He has never used smokeless tobacco. He reports that he does not currently use alcohol. He reports that he does not use drugs.    Medications:     Current Outpatient Medications:     fluticasone (FLONASE) 50 MCG/ACT nasal spray, 1 spray into the nostril(s) as directed by provider Daily., Disp: , Rfl:     multivitamin with minerals tablet tablet, Take 1 tablet by mouth Daily., Disp: , Rfl:     tamsulosin (FLOMAX) 0.4 MG capsule 24 hr capsule, Take 1 capsule by mouth Daily., Disp: 30 capsule, Rfl: 6    HYDROcodone-acetaminophen (NORCO) 5-325 MG per tablet, Take 1 tablet by mouth Every 6 (Six) Hours As Needed., Disp: , Rfl:     tamsulosin (FLOMAX) 0.4 MG capsule 24 hr capsule, Take 1 capsule by mouth Daily for 360 days., Disp: 90 capsule, Rfl: 3    Allergies:   Allergies   Allergen Reactions    Azithromycin Itching       IPSS Questionnaire (AUA-7):  Over the past month…    1)  Incomplete Emptying  How often have you had a sensation of not emptying your bladder?  1 - Less than 1 time in 5   2)  Frequency  How often have you had to urinate less than every two hours? 1 - Less than 1 time in 5   3)  Intermittency  How often have you found you stopped and started again several times when you urinated?  1 - Less than 1 time in 5   4) Urgency  How often have you found it difficult to postpone urination?  1 - Less than 1 time in 5   5) Weak Stream  How often have you had a weak urinary stream?  1 - Less than 1 time in 5   6) Straining  How often have you had to push or strain to begin urination?  0   7) Nocturia  How many times did you typically get up at night to  "urinate?  2 - 2 times   Total Score:  7   The International Prostate Symptom Score (IPSS) is used to screen, diagnose, track symptoms of benign prostatic hyperplasia (BPH).    0-7 pts (Mild Symptoms)  / 8-19 pts (Moderate) / 20-35 (Severe)    Quality of life due to urinary symptoms:  If you were to spend the rest of your life with your urinary condition the way it is now, how would you feel about that? 2-Mostly Satisfied   Urine Leakage (Incontinence) 0-No Leakage         Objective     Physical Exam:   Vital Signs:   Vitals:    04/24/24 0922   BP: 126/74   Pulse: 63   SpO2: 94%   Weight: 100 kg (221 lb)   Height: 186 cm (73.23\")   PainSc: 0-No pain     Body mass index is 28.98 kg/m².       Constitutional: Awake, alert    Eyes: PERRLA, sclerae anicteric, no conjunctival injection   HENT: Normocephalic, atraumatic, mucous membranes moist   Neck: trachea midline   Respiratory: Equal chest rise, non-labored respirations    Cardiovascular: well-perfused   Gastrointestinal: non-distended    Musculoskeletal: No bilateral ankle edema, no clubbing or cyanosis to extremities   Psychiatric: Appropriate affect, cooperative   Neurologic: Oriented x 3, Cranial Nerves grossly intact, speech clear   Skin: No rashes       Labs:   Brief Urine Lab Results  (Last result in the past 365 days)        Color   Clarity   Blood   Leuk Est   Nitrite   Protein   CREAT   Urine HCG        04/24/24 0948 Yellow   Clear   Negative   Negative   Negative   Negative                        Lab Results   Component Value Date    GLUCOSE 118 (H) 08/16/2023    CALCIUM 9.2 08/16/2023     08/16/2023    K 3.9 08/16/2023    CO2 22.6 08/16/2023     08/16/2023    BUN 8 08/16/2023    CREATININE 0.90 09/28/2023    EGFRIFNONA 77 01/17/2019    BCR 8.9 08/16/2023    ANIONGAP 10.4 08/16/2023       Lab Results   Component Value Date    WBC 7.96 08/02/2022    HGB 15.4 08/02/2022    HCT 45.3 08/02/2022    MCV 87.1 08/02/2022     08/02/2022       Lab " Results   Component Value Date    PSA 4.970 (H) 04/17/2024    PSA 5.030 (H) 08/16/2023    PSA 4.810 (H) 03/24/2023       Images:   No Images in the past 120 days found..    Measures:   Tobacco:   Noman Brewster  reports that he has never smoked. He has been exposed to tobacco smoke. He has never used smokeless tobacco.   Assessment / Plan      Assessment/Plan:   63 y.o. male who presented today for follow up of elevated PSA and BPH/LUTS. Prostate 100 g. PSA stable 4.9. IPSS down to 7 on flomax. We again discussed possible management of elevated PSA, including surveillance vs MR fusion biopsy.He elected to continue with surveillance. Regarding his LUTS, he deferred further intervention and elected to continue with flomax.     - refill flomax 0.4 mg daily   - RTC 6 months with PSA prior    Diagnoses and all orders for this visit:    1. Elevated prostate specific antigen (PSA) (Primary)  -     POC Urinalysis Dipstick, Automated  -     PSA Diagnostic; Future    2. Benign localized prostatic hyperplasia with lower urinary tract symptoms (LUTS)  -     tamsulosin (FLOMAX) 0.4 MG capsule 24 hr capsule; Take 1 capsule by mouth Daily.  Dispense: 30 capsule; Refill: 6  -     tamsulosin (FLOMAX) 0.4 MG capsule 24 hr capsule; Take 1 capsule by mouth Daily for 360 days.  Dispense: 90 capsule; Refill: 3         Follow Up:   Return in about 6 months (around 10/24/2024) for Recheck.    I spent approximately 30 minutes providing clinical care for this patient; including review of patient's chart and provider documentation, face to face time spent with patient in examination room (obtaining history, performing physical exam, discussing diagnosis and management options), placing orders, and completing patient documentation.     Kathi Wright MD  [facility] [place]

## 2024-08-14 ENCOUNTER — OFFICE VISIT (OUTPATIENT)
Dept: INTERNAL MEDICINE | Facility: CLINIC | Age: 64
End: 2024-08-14
Payer: COMMERCIAL

## 2024-08-14 VITALS
HEART RATE: 58 BPM | SYSTOLIC BLOOD PRESSURE: 120 MMHG | DIASTOLIC BLOOD PRESSURE: 72 MMHG | BODY MASS INDEX: 29.87 KG/M2 | HEIGHT: 73 IN | WEIGHT: 225.4 LBS | OXYGEN SATURATION: 98 % | TEMPERATURE: 98.4 F

## 2024-08-14 DIAGNOSIS — J30.1 SEASONAL ALLERGIC RHINITIS DUE TO POLLEN: ICD-10-CM

## 2024-08-14 DIAGNOSIS — Z13.0 SCREENING FOR DEFICIENCY ANEMIA: ICD-10-CM

## 2024-08-14 DIAGNOSIS — E78.5 DYSLIPIDEMIA: ICD-10-CM

## 2024-08-14 DIAGNOSIS — Z00.00 PREVENTATIVE HEALTH CARE: Primary | ICD-10-CM

## 2024-08-14 DIAGNOSIS — R73.09 ABNORMAL GLUCOSE: ICD-10-CM

## 2024-08-14 PROCEDURE — 99396 PREV VISIT EST AGE 40-64: CPT | Performed by: INTERNAL MEDICINE

## 2024-08-14 NOTE — PROGRESS NOTES
[facility]     Noman Brewster  1960   [id number]      Patient Care Team:  Adrian Farrell MD as PCP - General (Internal Medicine)    Chief Complaint::   Chief Complaint   Patient presents with    Annual Exam        HPI  History of Present Illness  The patient is a 64-year-old male who comes in for an annual examination and follow-up of dyslipidemia and allergic rhinitis. He is also followed by urology for abnormal PSA and benign prostatic hyperplasia (BPH).    He reports feeling well overall, with no new health concerns. He experiences some discomfort when rising from a seated position, which he attributes to aging.    His physical activity includes jogging approximately 4 miles three times a week and attending [employer] on the intervening days. He admits that his diet could be improved, but he derives pleasure from food and hopes that his exercise routine compensates for any dietary shortcomings.    FAMILY HISTORY  His mother has diabetes.    Chronic Conditions:      Patient Active Problem List   Diagnosis    Seasonal allergic rhinitis due to pollen    Dyslipidemia        Past Medical History:   Diagnosis Date    Allergic Childhood    Hay fever--dust, pollen, etc.    Cholelithiasis 2000    Gall bladder removed in 2000    Colon polyp 2022    Diverticulosis 2022       Past Surgical History:   Procedure Laterality Date    CHOLECYSTECTOMY  2000    COLONOSCOPY  2022    INGUINAL HERNIA REPAIR Left 2019    UMBILICAL HERNIA REPAIR  2010       Family History   Problem Relation Age of Onset    Diabetes type II Mother     Heart attack Father        Social History     Socioeconomic History    Marital status:    Tobacco Use    Smoking status: Never     Passive exposure: Past    Smokeless tobacco: Never   Vaping Use    Vaping status: Never Used   Substance and Sexual Activity    Alcohol use: Not Currently    Drug use: Never    Sexual activity: Yes     Partners: Female       Allergies    Allergen Reactions    Azithromycin Itching         Current Outpatient Medications:     fluticasone (FLONASE) 50 MCG/ACT nasal spray, 1 spray into the nostril(s) as directed by provider Daily., Disp: , Rfl:     multivitamin with minerals tablet tablet, Take 1 tablet by mouth Daily., Disp: , Rfl:     tamsulosin (FLOMAX) 0.4 MG capsule 24 hr capsule, Take 1 capsule by mouth Daily., Disp: 30 capsule, Rfl: 6    Immunization History   Administered Date(s) Administered    COVID-19 (MODERNA) Monovalent Original Booster 06/13/2022    COVID-19 (PFIZER) BIVALENT 12+YRS 10/21/2022    COVID-19 (PFIZER) Purple Cap Monovalent 03/04/2021, 03/27/2021, 11/05/2021    COVID-19 F23 (PFIZER) 12YRS+ (COMIRNATY) 11/10/2023    Influenza Injectable Mdck Pf Quad 10/21/2022    Shingrix 08/11/2023, 10/17/2023    Tdap 11/05/2021        Health Maintenance Due   Topic Date Due    ANNUAL PHYSICAL  08/11/2024    BMI FOLLOWUP  08/11/2024    INFLUENZA VACCINE  08/01/2024        Review of Systems   Constitutional:  Negative for activity change, chills, fatigue, fever, unexpected weight gain and unexpected weight loss.   HENT:  Negative for congestion, ear pain, hearing loss, postnasal drip and trouble swallowing.    Eyes:  Negative for blurred vision and visual disturbance.   Respiratory:  Negative for cough and shortness of breath.    Cardiovascular:  Negative for chest pain, palpitations and leg swelling.   Gastrointestinal:  Negative for abdominal pain, blood in stool, constipation, diarrhea and indigestion.   Endocrine: Negative for cold intolerance, heat intolerance, polydipsia and polyuria.   Genitourinary:  Negative for difficulty urinating, discharge, erectile dysfunction and testicular pain.   Musculoskeletal:  Negative for back pain, gait problem, joint swelling and myalgias.   Skin:  Negative for skin lesions.   Allergic/Immunologic: Negative for environmental allergies.   Neurological:  Negative for dizziness, syncope, speech difficulty,  "weakness, numbness, headache, memory problem and confusion.   Hematological:  Negative for adenopathy. Does not bruise/bleed easily.   Psychiatric/Behavioral:  Negative for decreased concentration, sleep disturbance, depressed mood and stress. The patient is not nervous/anxious.         Vital Signs  Vitals:    08/14/24 1110   BP: 120/72   BP Location: Right arm   Patient Position: Sitting   Cuff Size: Adult   Pulse: 58   Temp: 98.4 °F (36.9 °C)   TempSrc: Infrared   SpO2: 98%   Weight: 102 kg (225 lb 6.4 oz)   Height: 186 cm (73.23\")   PainSc: 0-No pain       Physical Exam  Vitals and nursing note reviewed.   Constitutional:       Appearance: Normal appearance. He is well-developed.   HENT:      Head: Normocephalic and atraumatic.      Right Ear: Tympanic membrane, ear canal and external ear normal.      Left Ear: Tympanic membrane, ear canal and external ear normal.      Nose: Nose normal.      Mouth/Throat:      Pharynx: Oropharynx is clear. No oropharyngeal exudate or posterior oropharyngeal erythema.   Eyes:      Extraocular Movements: Extraocular movements intact.      Conjunctiva/sclera: Conjunctivae normal.      Pupils: Pupils are equal, round, and reactive to light.   Neck:      Thyroid: No thyromegaly.      Vascular: No JVD.   Cardiovascular:      Rate and Rhythm: Normal rate and regular rhythm.      Heart sounds: Normal heart sounds. No murmur heard.     No friction rub. No gallop.   Pulmonary:      Effort: Pulmonary effort is normal. No respiratory distress.      Breath sounds: Normal breath sounds. No wheezing or rales.   Chest:      Chest wall: No tenderness.   Abdominal:      General: Bowel sounds are normal. There is no distension.      Palpations: Abdomen is soft. There is no mass.      Tenderness: There is no abdominal tenderness. There is no guarding or rebound.      Hernia: No hernia is present.   Musculoskeletal:         General: No tenderness. Normal range of motion.      Cervical back: Normal " range of motion and neck supple.      Right lower leg: No edema.      Left lower leg: No edema.   Lymphadenopathy:      Cervical: No cervical adenopathy.   Skin:     General: Skin is warm and dry.      Findings: No erythema or rash.   Neurological:      Mental Status: He is alert and oriented to person, place, and time.      Cranial Nerves: No cranial nerve deficit.      Motor: No weakness or abnormal muscle tone.      Gait: Gait normal.   Psychiatric:         Mood and Affect: Mood normal.         Behavior: Behavior normal.         Thought Content: Thought content normal.         Judgment: Judgment normal.        Physical Exam      Procedures     Fall Risk Screen:  STEADI Fall Risk Assessment has not been completed.    Health Habits and Functional and Cognitive Screening:       No data to display                Depression [place]  PHQ-9 Total Score: 0     ACE III MINI             Assessment/Plan:    Assessment & Plan  1. Dyslipidemia.  Lipid panel is pending. He will continue a healthy diet and regular exercise.    2. Abnormal glucose.  Fasting glucose last year was 118. A1c is pending. His mother has diabetes. The initial treatment is modest carb restriction and weight loss of 5 to 10 pounds.    3. Allergic rhinitis.  This is well controlled on Flonase nasal spray.    4. Abnormal PSA.  This is followed by urology. He is comfortable with a conservative approach as long as the urologist is.    5. Health Maintenance.  He is up to date on colorectal cancer screening.    Follow-up  The patient will follow up in 1 year.    BMI is >= 25 and <30. (Overweight) The following options were offered after discussion;: exercise counseling/recommendations and nutrition counseling/recommendations      Labs  Results  Laboratory Studies  Fasting glucose last year was 118.    Counseling was given to patient for the following topics: appropriate exercise as he is doing, disease prevention, and healthy eating habits.    Plan of care  reviewed with patient at the conclusion of today's visit. Education was provided regarding diagnosis, management, and any prescribed or recommended OTC medications.Patient verbalizes understanding of and agreement with management plan.     Patient or patient representative verbalized consent for the use of Ambient Listening during the visit with  Adrian Farrell MD for chart documentation. 8/18/2024  17:55 EDT        Ardian Farrell MD

## 2024-10-21 ENCOUNTER — LAB (OUTPATIENT)
Dept: LAB | Facility: HOSPITAL | Age: 64
End: 2024-10-21
Payer: COMMERCIAL

## 2024-10-21 DIAGNOSIS — R97.20 ELEVATED PROSTATE SPECIFIC ANTIGEN (PSA): ICD-10-CM

## 2024-10-21 LAB — PSA SERPL-MCNC: 6.12 NG/ML (ref 0–4)

## 2024-10-21 PROCEDURE — 84153 ASSAY OF PSA TOTAL: CPT

## 2024-10-21 PROCEDURE — 36415 COLL VENOUS BLD VENIPUNCTURE: CPT

## 2024-10-23 ENCOUNTER — OFFICE VISIT (OUTPATIENT)
Dept: UROLOGY | Facility: CLINIC | Age: 64
End: 2024-10-23
Payer: COMMERCIAL

## 2024-10-23 VITALS — BODY MASS INDEX: 29.55 KG/M2 | HEIGHT: 73 IN

## 2024-10-23 DIAGNOSIS — N40.1 BENIGN LOCALIZED PROSTATIC HYPERPLASIA WITH LOWER URINARY TRACT SYMPTOMS (LUTS): ICD-10-CM

## 2024-10-23 RX ORDER — TAMSULOSIN HYDROCHLORIDE 0.4 MG/1
1 CAPSULE ORAL DAILY
Qty: 90 CAPSULE | Refills: 3 | Status: SHIPPED | OUTPATIENT
Start: 2024-10-23

## 2024-10-23 NOTE — PROGRESS NOTES
Elevated PSA Office Visit      Patient Name: Noman Brewster  : 1960   MRN: [id number]     Chief Complaint:  Elevated PSA.    Chief Complaint   Patient presents with    Elevated PSA            History of Present Illness: Noman Brewster is a 64 y.o. male who presents today with history of elevated PSA.  He has been followed previously in our clinic for an elevated PSA for the past couple years.  He previously underwent an MRI of the prostate and 10/2023 that revealed a PI-RADS 3 lesion with 100 g prostate.  At that time we discussed biopsy of PI-RADS 3 lesion versus continued trending of PSA and ultimately elected to trend PSA.  He presents today for 6-month PSA lab draw that revealed value of 6.1.  His previous PSA values are listed below.    He has continued to take tamsulosin and reports that it has helped tremendously with his urinary symptoms.  He denies any bother from his urination currently and wants to continue on the tamsulosin.      Lab Results   Component Value Date    PSA 6.120 (H) 10/21/2024    PSA 4.970 (H) 2024    PSA 5.030 (H) 2023    PSA 4.810 (H) 2023    PSA 4.920 (H) 2022    PSA 5.600 (H) 2022           Subjective      Review of System:   Review of Systems   Genitourinary:  Negative for difficulty urinating, dysuria, flank pain, frequency, hematuria and urgency.      I have reviewed the ROS documented by my clinical staff, I have updated appropriately and I agree. Jean-Paul De Los Santos MD    Past Medical History:   Past Medical History:   Diagnosis Date    Allergic Childhood    Hay fever--dust, pollen, etc.    Benign prostatic hyperplasia 2023    Cholelithiasis     Gall bladder removed in     Colon polyp     Diverticulosis     Elevated PSA     Erectile dysfunction        Past Surgical History:   Past Surgical History:   Procedure Laterality Date    CHOLECYSTECTOMY      COLONOSCOPY      INGUINAL HERNIA REPAIR Left 2019     UMBILICAL HERNIA REPAIR  2010       Family History:   Family History   Problem Relation Age of Onset    Diabetes type II Mother     Heart attack Father        Social History:   Social History     Socioeconomic History    Marital status:    Tobacco Use    Smoking status: Never     Passive exposure: Past    Smokeless tobacco: Never   Vaping Use    Vaping status: Never Used   Substance and Sexual Activity    Alcohol use: Not Currently    Drug use: Never    Sexual activity: Yes     Partners: Female     Birth control/protection: Post-menopausal       Medications:     Current Outpatient Medications:     fluticasone (FLONASE) 50 MCG/ACT nasal spray, Administer 1 spray into the nostril(s) as directed by provider Daily., Disp: , Rfl:     multivitamin with minerals tablet tablet, Take 1 tablet by mouth Daily., Disp: , Rfl:     tamsulosin (FLOMAX) 0.4 MG capsule 24 hr capsule, Take 1 capsule by mouth Daily., Disp: 90 capsule, Rfl: 3    Allergies:   Allergies   Allergen Reactions    Azithromycin Itching       IPSS Questionnaire (AUA-7):  Over the past month…    1)  Incomplete Emptying  How often have you had a sensation of not emptying your bladder?  1 - Less than 1 time in 5   2)  Frequency  How often have you had to urinate less than every two hours? 2 - Less than half the time   3)  Intermittency  How often have you found you stopped and started again several times when you urinated?  1 - Less than 1 time in 5   4) Urgency  How often have you found it difficult to postpone urination?  1 - Less than 1 time in 5   5) Weak Stream  How often have you had a weak urinary stream?  1 - Less than 1 time in 5   6) Straining  How often have you had to push or strain to begin urination?  0 - Not at all   7) Nocturia  How many times did you typically get up at night to urinate?  2 - 2 times   Total Score:  8   The International Prostate Symptom Score (IPSS) is used to screen, diagnose, track symptoms of benign prostatic  "hyperplasia (BPH).    0-7 pts (Mild Symptoms)  / 8-19 pts (Moderate) / 20-35 (Severe)    Quality of life due to urinary symptoms:  If you were to spend the rest of your life with your urinary condition the way it is now, how would you feel about that? 2-Mostly Satisfied   Urine Leakage (Incontinence) 0-No Leakage         Objective     Physical Exam:   Vital Signs:   Vitals:    10/23/24 0920   Height: 186 cm (73.23\")     Body mass index is 29.55 kg/m².     Physical Exam  Vitals and nursing note reviewed.   Constitutional:       General: He is not in acute distress.     Appearance: Normal appearance.   HENT:      Head: Normocephalic and atraumatic.      Right Ear: External ear normal.      Left Ear: External ear normal.      Nose: Nose normal.   Eyes:      Conjunctiva/sclera: Conjunctivae normal.   Pulmonary:      Effort: Pulmonary effort is normal. No respiratory distress.   Abdominal:      Palpations: Abdomen is soft.      Tenderness: There is no abdominal tenderness.   Musculoskeletal:         General: No deformity. Normal range of motion.      Cervical back: Normal range of motion.   Skin:     General: Skin is warm.   Neurological:      General: No focal deficit present.      Mental Status: He is alert and oriented to person, place, and time. Mental status is at baseline.   Psychiatric:         Mood and Affect: Mood normal.         Behavior: Behavior normal.         Thought Content: Thought content normal.         Judgment: Judgment normal.         Labs:   Hematocrit (%)   Date Value   08/02/2022 45.3   01/17/2019 46.5       Lab Results   Component Value Date    PSA 6.120 (H) 10/21/2024    PSA 4.970 (H) 04/17/2024    PSA 5.030 (H) 08/16/2023       Images:   MRI --10/11/2023 that reveals a PI-RADS 3 lesion with 100 g prostate    Measures:   Tobacco:   Noman Brewster  reports that he has never smoked. He has been exposed to tobacco smoke. He has never used smokeless tobacco.     Urine Incontinence: Patient reports " that he is not currently experiencing any symptoms of urinary incontinence.         Assessment / Plan      Assessment/Plan:   Mr. Brewster is a 64 y.o. male with elevated PSA.  We have followed him for this issue for past couple years.  We reviewed his PSA today that reveals a value of 6.1 which is mildly higher than prior.  We did discuss that his PSA has ranged form 4.8-5.6 over the last 2 years so todays value is higher but not significantly elevated. We discussed repeating PSA in 3-6 months vs doing a biopsy and/or repeat MRI now. He said he is moving to [place] in 2 months and has researched a urologist there. We will tentatively make him an appointment with our clinic but if he moves then he can cancel this appointment. We will also refill tamsulosin prior to his move.     Diagnoses and all orders for this visit:    1. Benign localized prostatic hyperplasia with lower urinary tract symptoms (LUTS)  -     tamsulosin (FLOMAX) 0.4 MG capsule 24 hr capsule; Take 1 capsule by mouth Daily.  Dispense: 90 capsule; Refill: 3        Follow Up:   Follow up in [place] vs our office for PSA monitoring    I spent approximately 30 minutes providing clinical care for this patient; including review of patient's chart and provider documentation, face to face time spent with patient in examination room (obtaining history, performing physical exam, discussing diagnosis and management options), placing orders, and completing patient documentation.     Kathi Wright MD  [facility]

## 2024-11-12 ENCOUNTER — LAB (OUTPATIENT)
Dept: LAB | Facility: HOSPITAL | Age: 64
End: 2024-11-12
Payer: COMMERCIAL

## 2024-11-12 DIAGNOSIS — E78.5 DYSLIPIDEMIA: ICD-10-CM

## 2024-11-12 DIAGNOSIS — R73.09 ABNORMAL GLUCOSE: ICD-10-CM

## 2024-11-12 DIAGNOSIS — Z13.0 SCREENING FOR DEFICIENCY ANEMIA: ICD-10-CM

## 2024-11-12 LAB
ALBUMIN SERPL-MCNC: 4 G/DL (ref 3.5–5.2)
ALBUMIN/GLOB SERPL: 1.3 G/DL
ALP SERPL-CCNC: 77 U/L (ref 39–117)
ALT SERPL W P-5'-P-CCNC: 20 U/L (ref 1–41)
ANION GAP SERPL CALCULATED.3IONS-SCNC: 9.8 MMOL/L (ref 5–15)
AST SERPL-CCNC: 23 U/L (ref 1–40)
BASOPHILS # BLD AUTO: 0.02 10*3/MM3 (ref 0–0.2)
BASOPHILS NFR BLD AUTO: 0.3 % (ref 0–1.5)
BILIRUB SERPL-MCNC: 1 MG/DL (ref 0–1.2)
BUN SERPL-MCNC: 13 MG/DL (ref 8–23)
BUN/CREAT SERPL: 13.5 (ref 7–25)
CALCIUM SPEC-SCNC: 9.2 MG/DL (ref 8.6–10.5)
CHLORIDE SERPL-SCNC: 106 MMOL/L (ref 98–107)
CHOLEST SERPL-MCNC: 158 MG/DL (ref 0–200)
CO2 SERPL-SCNC: 24.2 MMOL/L (ref 22–29)
CREAT SERPL-MCNC: 0.96 MG/DL (ref 0.76–1.27)
DEPRECATED RDW RBC AUTO: 37.1 FL (ref 37–54)
EGFRCR SERPLBLD CKD-EPI 2021: 88.3 ML/MIN/1.73
EOSINOPHIL # BLD AUTO: 0.22 10*3/MM3 (ref 0–0.4)
EOSINOPHIL NFR BLD AUTO: 3.2 % (ref 0.3–6.2)
ERYTHROCYTE [DISTWIDTH] IN BLOOD BY AUTOMATED COUNT: 11.7 % (ref 12.3–15.4)
GLOBULIN UR ELPH-MCNC: 3.2 GM/DL
GLUCOSE SERPL-MCNC: 104 MG/DL (ref 65–99)
HBA1C MFR BLD: 5.4 % (ref 4.8–5.6)
HCT VFR BLD AUTO: 45.7 % (ref 37.5–51)
HDLC SERPL-MCNC: 37 MG/DL (ref 40–60)
HGB BLD-MCNC: 15.5 G/DL (ref 13–17.7)
IMM GRANULOCYTES # BLD AUTO: 0.02 10*3/MM3 (ref 0–0.05)
IMM GRANULOCYTES NFR BLD AUTO: 0.3 % (ref 0–0.5)
LDLC SERPL CALC-MCNC: 107 MG/DL (ref 0–100)
LDLC/HDLC SERPL: 2.9 {RATIO}
LYMPHOCYTES # BLD AUTO: 2.21 10*3/MM3 (ref 0.7–3.1)
LYMPHOCYTES NFR BLD AUTO: 32.5 % (ref 19.6–45.3)
MCH RBC QN AUTO: 29.8 PG (ref 26.6–33)
MCHC RBC AUTO-ENTMCNC: 33.9 G/DL (ref 31.5–35.7)
MCV RBC AUTO: 87.7 FL (ref 79–97)
MONOCYTES # BLD AUTO: 0.43 10*3/MM3 (ref 0.1–0.9)
MONOCYTES NFR BLD AUTO: 6.3 % (ref 5–12)
NEUTROPHILS NFR BLD AUTO: 3.91 10*3/MM3 (ref 1.7–7)
NEUTROPHILS NFR BLD AUTO: 57.4 % (ref 42.7–76)
NRBC BLD AUTO-RTO: 0 /100 WBC (ref 0–0.2)
PLATELET # BLD AUTO: 225 10*3/MM3 (ref 140–450)
PMV BLD AUTO: 10.2 FL (ref 6–12)
POTASSIUM SERPL-SCNC: 4.3 MMOL/L (ref 3.5–5.2)
PROT SERPL-MCNC: 7.2 G/DL (ref 6–8.5)
RBC # BLD AUTO: 5.21 10*6/MM3 (ref 4.14–5.8)
SODIUM SERPL-SCNC: 140 MMOL/L (ref 136–145)
TRIGL SERPL-MCNC: 69 MG/DL (ref 0–150)
VLDLC SERPL-MCNC: 14 MG/DL (ref 5–40)
WBC NRBC COR # BLD AUTO: 6.81 10*3/MM3 (ref 3.4–10.8)

## 2024-11-12 PROCEDURE — 85025 COMPLETE CBC W/AUTO DIFF WBC: CPT

## 2024-11-12 PROCEDURE — 36415 COLL VENOUS BLD VENIPUNCTURE: CPT

## 2024-11-12 PROCEDURE — 83036 HEMOGLOBIN GLYCOSYLATED A1C: CPT

## 2024-11-12 PROCEDURE — 80053 COMPREHEN METABOLIC PANEL: CPT

## 2024-11-12 PROCEDURE — 82172 ASSAY OF APOLIPOPROTEIN: CPT

## 2024-11-12 PROCEDURE — 80061 LIPID PANEL: CPT

## 2024-11-14 LAB — APO B SERPL-MCNC: 91 MG/DL

## 2025-01-28 ENCOUNTER — TELEPHONE (OUTPATIENT)
Dept: UROLOGY | Facility: CLINIC | Age: 65
End: 2025-01-28
Payer: COMMERCIAL

## 2025-01-28 NOTE — TELEPHONE ENCOUNTER
Caller: Noman Brewster     Relationship: SELF    Best call back number: [phone]     What orders are you requesting (i.e. lab or imaging): LABS    In what timeframe would the patient need to come in:     Where will you receive your lab/imaging services:  ON 2-24-25    Additional notes: PT IS CURRENTLY WORKING IN [place] AND WILL COME HOME TO DO LABS AND HAS RESCHEDULED HIS F/U APPT. HE NEEDS ORDER FOR PSA LABS. [facility] PL[facility] TO GET THEM DONE AND APPT IS ON THE AFTERNOON OF 2-25-25.

## 2025-01-29 DIAGNOSIS — R97.20 ELEVATED PROSTATE SPECIFIC ANTIGEN (PSA): Primary | ICD-10-CM

## 2025-01-29 NOTE — TELEPHONE ENCOUNTER
"Relay     \"Tried to contact patient. # on file keeps ringing. Wanted to update the patient, that Dr. Wright has placed labs for him as requested to have drawn prior to his follow up appt in February \"                 "

## 2025-02-24 ENCOUNTER — LAB (OUTPATIENT)
Dept: LAB | Facility: HOSPITAL | Age: 65
End: 2025-02-24
Payer: COMMERCIAL

## 2025-02-24 DIAGNOSIS — R97.20 ELEVATED PROSTATE SPECIFIC ANTIGEN (PSA): ICD-10-CM

## 2025-02-24 PROCEDURE — 84153 ASSAY OF PSA TOTAL: CPT

## 2025-02-24 PROCEDURE — 36415 COLL VENOUS BLD VENIPUNCTURE: CPT

## 2025-02-25 ENCOUNTER — OFFICE VISIT (OUTPATIENT)
Age: 65
End: 2025-02-25
Payer: COMMERCIAL

## 2025-02-25 DIAGNOSIS — R97.20 ELEVATED PROSTATE SPECIFIC ANTIGEN (PSA): Primary | ICD-10-CM

## 2025-02-25 LAB — PSA SERPL-MCNC: 5.15 NG/ML (ref 0–4)

## 2025-02-25 PROCEDURE — 99214 OFFICE O/P EST MOD 30 MIN: CPT | Performed by: UROLOGY

## 2025-02-25 NOTE — PROGRESS NOTES
Follow Up Office Visit      Patient Name: Noman Brewster  : 1960   MRN: [id number]     Chief Complaint:    Chief Complaint   Patient presents with    Benign localized prostatic hyperplasia with lower urinary t    Elevated PSA       Referring Provider: No ref. provider found    History of Present Illness: Noman Brewster is a 64 y.o. male who presents today for follow up of elevated PSA.  He reports he has done well and is now living in [place].    His PSA has come down slightly.  He had a MRI previously which showed a PIRADS 3 lesion.    He has had no changes since his last visit.      Subjective      Review of System:   Review of Systems   I have reviewed the ROS documented by my clinical staff, I have updated appropriately and I agree. Kathi Wright MD    I have reviewed and the following portions of the patient's history were updated as appropriate: past family history, past medical history, past social history, past surgical history and problem list.    Past Medical History:   Past Medical History:   Diagnosis Date    Allergic Childhood    Hay fever--dust, pollen, etc.    Benign prostatic hyperplasia 2023    Cancer     Basal cell, very small, Mohs surgery    Cholelithiasis     Gall bladder removed in     Colon polyp     Diverticulosis     Elevated PSA     Erectile dysfunction        Past Surgical History:  Past Surgical History:   Procedure Laterality Date    CHOLECYSTECTOMY      COLONOSCOPY      INGUINAL HERNIA REPAIR Left 2019    UMBILICAL HERNIA REPAIR         Family History:   family history includes Diabetes type II in his mother; Heart attack in his father.   Otherwise pertinent FHx was reviewed and not pertinent to current issue.    Social History:    reports that he has never smoked. He has been exposed to tobacco smoke. He has never used smokeless tobacco. He reports that he does not currently use alcohol. He reports that he does not use  drugs.    Medications:     Current Outpatient Medications:     fluticasone (FLONASE) 50 MCG/ACT nasal spray, Administer 1 spray into the nostril(s) as directed by provider Daily., Disp: , Rfl:     multivitamin with minerals tablet tablet, Take 1 tablet by mouth Daily., Disp: , Rfl:     tamsulosin (FLOMAX) 0.4 MG capsule 24 hr capsule, Take 1 capsule by mouth Daily., Disp: 90 capsule, Rfl: 3    Allergies:   Allergies   Allergen Reactions    Azithromycin Itching       IPSS Questionnaire (AUA-7):  Over the past month…    1)  Incomplete Emptying  How often have you had a sensation of not emptying your bladder?  1 - Less than 1 time in 5   2)  Frequency  How often have you had to urinate less than every two hours? 2 - Less than half the time   3)  Intermittency  How often have you found you stopped and started again several times when you urinated?  0 - Not at all   4) Urgency  How often have you found it difficult to postpone urination?  0 - Not at all   5) Weak Stream  How often have you had a weak urinary stream?  2 - Less than half the time   6) Straining  How often have you had to push or strain to begin urination?  0 - Not at all   7) Nocturia  How many times did you typically get up at night to urinate?  2 - 2 times   Total Score:  7   The International Prostate Symptom Score (IPSS) is used to screen, diagnose, track symptoms of benign prostatic hyperplasia (BPH).    0-7 pts (Mild Symptoms)  / 8-19 pts (Moderate) / 20-35 (Severe)    Quality of life due to urinary symptoms:  If you were to spend the rest of your life with your urinary condition the way it is now, how would you feel about that? 2-Mostly Satisfied   Urine Leakage (Incontinence) 0-No Leakage       Objective     Physical Exam:   Vital Signs: There were no vitals filed for this visit.  There is no height or weight on file to calculate BMI.     Physical Exam  Vitals and nursing note reviewed.   Constitutional:       General: He is awake. He is not in  acute distress.     Appearance: Normal appearance. He is well-developed.   HENT:      Head: Normocephalic and atraumatic.      Right Ear: External ear normal.      Left Ear: External ear normal.      Nose: Nose normal.   Eyes:      Conjunctiva/sclera: Conjunctivae normal.   Pulmonary:      Effort: Pulmonary effort is normal.   Abdominal:      General: There is no distension.      Palpations: Abdomen is soft. There is no mass.      Tenderness: There is no abdominal tenderness. There is no right CVA tenderness, left CVA tenderness, guarding or rebound.      Hernia: No hernia is present. There is no hernia in the left inguinal area or right inguinal area.   Genitourinary:     Pubic Area: No rash.       Penis: Normal.       Rectum: No mass or tenderness. Normal anal tone.   Musculoskeletal:      Cervical back: Normal range of motion.   Lymphadenopathy:      Lower Body: No right inguinal adenopathy. No left inguinal adenopathy.   Skin:     General: Skin is warm.   Neurological:      General: No focal deficit present.      Mental Status: He is alert and oriented to person, place, and time.   Psychiatric:         Behavior: Behavior normal. Behavior is cooperative.         Labs:   Brief Urine Lab Results  (Last result in the past 365 days)        Color   Clarity   Blood   Leuk Est   Nitrite   Protein   CREAT   Urine HCG        04/24/24 0948 Yellow   Clear   Negative   Negative   Negative   Negative                        Lab Results   Component Value Date    GLUCOSE 104 (H) 11/12/2024    CALCIUM 9.2 11/12/2024     11/12/2024    K 4.3 11/12/2024    CO2 24.2 11/12/2024     11/12/2024    BUN 13 11/12/2024    CREATININE 0.96 11/12/2024    EGFRIFNONA 77 01/17/2019    BCR 13.5 11/12/2024    ANIONGAP 9.8 11/12/2024       Lab Results   Component Value Date    WBC 6.81 11/12/2024    HGB 15.5 11/12/2024    HCT 45.7 11/12/2024    MCV 87.7 11/12/2024     11/12/2024       Lab Results   Component Value Date    PSA  5.150 (H) 02/24/2025    PSA 6.120 (H) 10/21/2024    PSA 4.970 (H) 04/17/2024       Images:   No Images in the past 120 days found..    Measures:   Tobacco:   Noman Brewster  reports that he has never smoked. He has been exposed to tobacco smoke. He has never used smokeless tobacco.       Urine Incontinence: Patient reports that he is not currently experiencing any symptoms of urinary incontinence.         Assessment / Plan      Assessment/Plan:   64 y.o. male who presented today for follow up of elevated PSA.  His PSA has come down slightly but has stayed relatively stable.  I discussed his options and he is now living in [place].  I discussed I would recommend repeat PSA in 6 months.  In the meantime, he is going to search for a Urologist closer to him.  He has some names from [place] there.    I will see him back PRN.      Diagnoses and all orders for this visit:    1. Elevated prostate specific antigen (PSA) (Primary)         Follow Up:   Return if symptoms worsen or fail to improve.    I spent approximately 30 minutes providing clinical care for this patient; including review of patient's chart and provider documentation, face to face time spent with patient in examination room (obtaining history, performing physical exam, discussing diagnosis and management options), placing orders, and completing patient documentation.     Kathi Wright MD  [facility]